# Patient Record
Sex: FEMALE | Race: WHITE | Employment: FULL TIME | URBAN - METROPOLITAN AREA
[De-identification: names, ages, dates, MRNs, and addresses within clinical notes are randomized per-mention and may not be internally consistent; named-entity substitution may affect disease eponyms.]

---

## 2017-02-20 NOTE — PROGRESS NOTES
HPI:    Patient ID: Layla Single is a 46year old female. HPI  with irregular and light menses on [facility]. She has bled almost weekly in past 3 months.  She is having some fatigue and also malodorous DC and intermittent deep thrus Neurological: She is alert. Skin: She is not diaphoretic.               ASSESSMENT/PLAN:   Encounter for gynecological examination without abnormal finding  (primary encounter diagnosis)  Menometrorrhagia  Screening mammogram, encounter for  Chronic fat

## 2017-02-20 NOTE — PROGRESS NOTES
IUD Removal     Pregnancy Results: n/a  Birth control method(s) used:  ; date last used:    Consent signed. Procedure discussed with the patient in detail including indication, risks, benefits, alternatives and complications.     Pelvic Exam Findings:  See

## 2017-03-15 NOTE — TELEPHONE ENCOUNTER
From: Sharon Flores  To: Triny More DO  Sent: 3/15/2017 1:25 PM CDT  Subject: Prescription Question    Hi- my pharmacist never received my prescription request on Feb 13th that was sent from your office. Can you please check into this?

## 2017-04-11 NOTE — TELEPHONE ENCOUNTER
From: Nicholas Flores  To:  Deya Medina DO  Sent: 4/11/2017 3:42 PM CDT  Subject: Non-Urgent Medical Question    Hi- wondering if you can send me the name of the doctor that Dr. Ana Dang referred me to for my mammogram.    Also- can Dr. Ana Dang

## 2017-05-01 NOTE — PROGRESS NOTES
Viridiana Gregorio is a 38year old female.  Patient presents with:  Ear Pain: Pt c/o on/off sharp pain in both ears     HPI:   She went to Immediate Care on April 13 with an approximately 1 month history of progressively severe and eventually constant EXAM:   GENERAL: Pleasant female appearing well and breathing comfortably in no distress  /76 mmHg  Pulse 67  Temp(Src) 98.3 °F (36.8 °C) (Oral)  Ht 5' 7.75\" (1.721 m)  Wt 151 lb (68.493 kg)  BMI 23.13 kg/m2  HEENT: Anicteric, conjunctiva pink,

## 2017-05-12 NOTE — TELEPHONE ENCOUNTER
Pt contacted and ENT information provided as below for pt to call for an OV appt. Pt verbalized understanding and agreement with plan of care.       Referral Information      Referred to [address]     Adonay Jimenez [address] [place] [address] [phone]

## 2017-05-12 NOTE — TELEPHONE ENCOUNTER
Per pt, she saw Dr Jodie Gifford last 05/01/2017 and mentioned to him issue about both ears and now both of her ears is still in pain and would like to know if he can send pt to ENT asap.   Pt stts that Dr Jodie Gifford did not give pt any ear drops,  took Ibuprofen it hel

## 2017-05-16 NOTE — PROGRESS NOTES
Ny Núñez is a 38year old female.   Patient presents with:  Ear Problem: c/o ear pain of both ears, had ear infections on 3rd week of april 2017 -had 1 course of antibiotic and per pt it did not help      HISTORY OF PRESENT ILLNESS  5/15/2017 Pap smear 2002     LEEP   • Bartholin cyst 2010     excision of Batholin's cyst   • Vulvar hematoma 2010     post op, evac and revision surgery           Past Surgical History    LEEP  2002    OTHER SURGICAL HISTORY  2010    Comment excision of Batholin's Normal, Left: Normal.   Skin Normal Inspection - Normal.         Lymph Detail Normal Submental. Submandibular. Anterior cervical. Posterior cervical. Supraclavicular.    Larynx  No lesions noted with normal vc monbility   Nose/Mouth/Throat Normal External n

## 2017-06-07 NOTE — ED PROVIDER NOTES
Patient Seen in: [facility] AND [facility] Emergency Department    History   Patient presents with: Ankle Injury    Stated Complaint: R ankle injury    HPI    Patient presents into the emergency room for evaluation of her right foot injury.   Patient states this drinks or equivalent per week       Comment: weekly      Review of Systems   Musculoskeletal:        Right foot injury   All other systems reviewed and are negative. Positive for stated complaint: R ankle injury  Other systems are as noted in HPI.   Co following splint application.           Disposition and Plan     Clinical Impression:  Foot fracture, right, closed, initial encounter  (primary encounter diagnosis)    Disposition:  Discharge    Follow-up:  Acosta Booker MD  [id number]

## 2017-06-07 NOTE — ED NOTES
Patient discharged, patient instructed to follow up with the doctor, return if worse, take medication as directed.

## 2017-06-08 NOTE — TELEPHONE ENCOUNTER
Pt calling back requesting referral be changed to Dr Gil Martinez/Uyen for fractured foot  Was able to get appt today at 1:50p

## 2017-06-08 NOTE — PROGRESS NOTES
HPI:    Patient ID: Francisco Diallo is a 38year old female. HPI  This pleasant 70-year-old female presents as a new patient to me on consult from [address] [place] [address].   Patient states that yesterday morning she missed stepped and had significant pain in the palpate the region. X-ray demonstrates a pointed out area as though there is a fracture of the lateral cuboid. It certainly not seen on a review and there is a question in my mind.   Based on location and the fact that she is better with splinting I place

## 2017-06-08 NOTE — TELEPHONE ENCOUNTER
Appointment made for this afternoon  With Dr. Katherine Sy. Pt informed on need for referral thru Dr. Edilson Lewis office.

## 2017-09-19 RX ORDER — VALACYCLOVIR HYDROCHLORIDE 500 MG/1
TABLET, FILM COATED ORAL
Qty: 30 TABLET | Refills: 0 | OUTPATIENT
Start: 2017-09-19

## 2017-10-26 NOTE — TELEPHONE ENCOUNTER
Pt advised that we did not deny refills. Pt was given 1 year rx 3/15/17 so should have refills remaining. Pt will call pharmacy. Pt advised to have pharmacy call us if any questions. Pt verbalizes understanding.

## 2018-01-29 NOTE — TELEPHONE ENCOUNTER
Pt calling to report that she has been having lower pelvic pain with IC for the past 6 months-1 year. Pt stated that the pain has gotten worse throughout the last few months.  Pt stated that the pelvic pain has become more regular now and also stated that s

## 2018-01-30 NOTE — PROGRESS NOTES
HPI:    Patient ID: Nori Shipley is a 38year old female. HPI   with menses q 28d / 7d / heavy flow. Withdrawal for BC. Here today with new complaint of entry and deep thrust dyspareunia in past six months.  She also relates lan Encounter      Urinalysis, Routine    Meds This Visit:  No prescriptions requested or ordered in this encounter    Imaging & Referrals:  US PELVIS EV (TRNS ABD AND ENDOVAG) (ECK=[id number])       FG#[id number]

## 2018-01-31 ENCOUNTER — TELEPHONE (OUTPATIENT)
Dept: OBGYN CLINIC | Facility: CLINIC | Age: 43
End: 2018-01-31

## 2018-01-31 NOTE — TELEPHONE ENCOUNTER
From: Silver Flores  To: Mauricio Savage DO  Sent: 1/31/2018 1:44 PM CST  Subject: Test Results Question    Hi I'm wondering if my urine test results came back yet?     Thank you

## 2018-02-01 NOTE — TELEPHONE ENCOUNTER
From: Cecy Flores  To: Alvin James DO  Sent: 2/1/2018 9:03 AM CST  Subject: Test Results Question    Hi- What does BV mean? How would I have contracted this? Does he still want me to get the ultra sound?  Could this be causing the pain in m

## 2018-02-02 NOTE — TELEPHONE ENCOUNTER
From: Mino Flores  To: Karolina Bassett DO  Sent: 2/2/2018 11:26 AM CST  Subject: Prescription Question    Hi Rosa I haven't started the prescription yet because it wasn't ready until today. But now I woke up with a stomach bug.  Wondering if I

## 2018-02-16 ENCOUNTER — TELEPHONE (OUTPATIENT)
Dept: OBGYN CLINIC | Facility: CLINIC | Age: 43
End: 2018-02-16

## 2018-02-16 NOTE — TELEPHONE ENCOUNTER
Helena from [facility] states pt has an appt with them for a mammogram and if an order can be put in the system.  Please advise

## 2018-02-26 NOTE — TELEPHONE ENCOUNTER
LAST ANNUAL 2-9-17 SO WILL NEED AN APPT FOR AN ANNUAL TO GET AN ORDER FROM DONNA.  REGISTRATION NOTIFIED AND SAID THEY WILL MIGUEL PT AS SELF-REFERRED INSTEAD.

## 2018-07-05 ENCOUNTER — TELEPHONE (OUTPATIENT)
Dept: OBGYN CLINIC | Facility: CLINIC | Age: 43
End: 2018-07-05

## 2018-07-05 NOTE — TELEPHONE ENCOUNTER
Pt requesting Valtrex refill. She feels an outbreak starting and ran out of refills. Pt states she usually takes it daily but she moved and was busy and forgot to take for a while. She is now getting an outbreak and needs a refill. Last annual 2/2017, seen for gyne problem 1/2018.  Routed to [facility] for refill approval.

## 2018-07-06 NOTE — ED PROVIDER NOTES
Patient Seen in: [facility]    History   Patient presents with:  Cough/URI    Stated Complaint: Cough    HPI    Pt is a 40 yo with a 10 day h/o cold sx. She is a smoker.  For the past 3 days, patient has been wheezing and congestion   Eyes: Conjunctivae and EOM are normal. Pupils are equal, round, and reactive to light. Neck: Normal range of motion. Neck supple. Cardiovascular: Normal rate and regular rhythm. Pulmonary/Chest: Effort normal. No respiratory distress.  Darrelyn Ouch

## 2018-07-06 NOTE — ED INITIAL ASSESSMENT (HPI)
5 days of congestion. Now with cough and body aches. Productive \"green\" cough. No fever. + smoker. Chest tightness with cough. Exertional SOB.

## 2018-08-08 NOTE — TELEPHONE ENCOUNTER
Pt states that she is having an out break and needs a rx for Valtrex. She states she also had an outbreak in 7/2018. Pt states that she had called for a rx in July 2018 and never got a response.       Pt's last Annual was 2-2017 and her last visit was 1-2

## 2019-02-18 NOTE — PATIENT INSTRUCTIONS
PLAN:  -fasting bloodwork  -referral to Derm provided  -Pneumovax vaccination  -log BP twice daily. Send me your log in 2 weeks for review  -smoking cessation encouraged.  Not motivated at this time  -f/u annually/as needed

## 2019-02-18 NOTE — PROGRESS NOTES
HPI: 41year old female presents for a general physical (NP). VSS. Denies fevers/chills, C.P., palpitations, dizziness, SOB, cough, abd pain, N/V/D, fatigue. No recent illness. Nml urine without dysuria or hematuria. Nml BM's. Weight is stable.  Sees OB/gyn Nml ADLs. Able to manage affairs. Head: Normocephalic, without obvious abnormality, atraumatic. Eyes: Conjunctivae/corneas clear. PERRL, EOMs intact. Ears: Normal TMs and external ear canals both ears. Nose: Nares normal. Septum midline.  Mucosa normal.

## 2019-02-19 PROBLEM — G47.00 INSOMNIA: Status: ACTIVE | Noted: 2019-02-19

## 2019-02-19 PROBLEM — R53.83 OTHER FATIGUE: Status: ACTIVE | Noted: 2019-02-19

## 2019-02-19 PROBLEM — F17.200 NICOTINE DEPENDENCE: Status: ACTIVE | Noted: 2019-02-19

## 2019-02-19 PROBLEM — L91.8 SKIN TAG: Status: ACTIVE | Noted: 2019-02-19

## 2019-02-19 PROBLEM — F41.0 PANIC ATTACKS: Status: ACTIVE | Noted: 2019-02-19

## 2019-02-19 PROBLEM — F41.9 ANXIETY: Status: ACTIVE | Noted: 2019-02-19

## 2019-02-19 PROBLEM — L82.1 SEBORRHEIC KERATOSIS: Status: ACTIVE | Noted: 2019-02-19

## 2019-02-19 PROBLEM — R23.3 EASY BRUISING: Status: ACTIVE | Noted: 2019-02-19

## 2019-02-20 PROBLEM — E78.5 DYSLIPIDEMIA: Status: ACTIVE | Noted: 2019-02-20

## 2019-02-21 NOTE — TELEPHONE ENCOUNTER
Depends on the underlying cause of fatigue (no abnormality in bloodwork to explain her s/s)-please have her f/u in the office to discuss further

## 2019-02-21 NOTE — TELEPHONE ENCOUNTER
Spoke to pt, informed her of lab results and recommendations per Dr. Nsetor Caban. Pt to increase exercise and take OTC fish oil. Pt instructed to recheck lipid levels in 3 months and focus on lifestyle modifications to decrease LDL and triglycerides.  Pt annmarie

## 2019-02-21 NOTE — TELEPHONE ENCOUNTER
----- Message from Emely Husbands, DO sent at 2/20/2019 10:02 AM CST -----  Nml CBC/CMP/TFTs/INR/PTT/iron levels    Lipids: LDL chol & trig elevated.  Advise HHD/DASH diet for LDL chol lowering; low carb diet, regular exercise: goal 5x/week of moderate-i

## 2019-05-14 NOTE — PATIENT INSTRUCTIONS
PLAN:  -Nicotine Patch  -referral to Gaby luis (Navigator)    -heart healthy diet/DASH diet for cholesterol lowering  -regular exercise: goal 5x/week of moderate-intensity exercise  -Omega 3 Fish Oils daily  -smoking cessation  -check lipids

## 2019-05-14 NOTE — PROGRESS NOTES
HPI: 45year old female presents for smoking cessation. VSS. Patient motivated to quit. Has not tried anything in the past but feels \"I need help quitting; I can't do it on my own\" She has not seen a therapist prior.  PMH significant for anxiety and panic cyanosis or edema. Skin: Skin color, texture, turgor normal. No rashes or lesions. Over 50% of this 25 minute visit was spent on counseling and coordination of care. IMPRESSION:  1. Nicotine Dependence  2. Anxiety  3. Panic Attacks  4.  Dyslipidemia

## 2019-06-25 NOTE — ED PROVIDER NOTES
Patient Seen in: [facility] [place] AND [facility] Emergency Department    History   Patient presents with:  Lower Extremity Injury (musculoskeletal)    Stated Complaint:     HPI    53-year-old female without walking her dogs and sprained her ankle she believes last nig well-developed. No distress. Head: Normocephalic and atraumatic. Eyes: Conjunctivae are normal. Pupils are equal, round, and reactive to light. Cardiovascular: Normal rate, regular rhythm and intact distal pulses.     Musculoskeletal: No noted edema o soon as possible for a visit in 2 days          Medications Prescribed:  Discharge Medication List as of 6/25/2019  7:49 AM

## 2019-06-25 NOTE — ED NOTES
Nickolas Mccallum is here for eval of left lateral foot pain since yesterday. States she was walking her dog last night when she slipped and rolled her ankle. She states she iced and elevated her foot yesterday but today had difficulty bearing weight.   She rates

## 2019-06-25 NOTE — ED INITIAL ASSESSMENT (HPI)
Left ankle pain s/p fall yesterday while walking her dog. Cms intact.  Pt  Reports unable to bear weight today on ext

## 2019-06-27 PROBLEM — N92.0 MENORRHAGIA WITH REGULAR CYCLE: Status: ACTIVE | Noted: 2019-06-27

## 2019-06-27 PROBLEM — M79.672 LEFT FOOT PAIN: Status: ACTIVE | Noted: 2019-06-27

## 2019-06-27 PROBLEM — M25.572 ACUTE LEFT ANKLE PAIN: Status: ACTIVE | Noted: 2019-06-27

## 2019-06-27 NOTE — TELEPHONE ENCOUNTER
Pt left foot hurt after injuery. Pt was at ER on Tuesday. Pt is asking if Dr Pedro Don can prescribe any anti-inflammatory med for her. Plz call to advise.

## 2019-06-27 NOTE — PATIENT INSTRUCTIONS
PLAN:  -T#3 for pain as needed  -OTC Ibuprofen 600mg every 6 hours as needed  -ice, elevate, rest  -brace  -referral to Orthopedics provided  -f/u as needed

## 2019-06-27 NOTE — TELEPHONE ENCOUNTER
Spoke with pt, advised apt for ER follow up L ankle injury and continuation of rx's prescribed by ER.   Patient verbalizes understanding, scheduled apt for today at 1 pm.

## 2019-06-27 NOTE — PROGRESS NOTES
HPI: 45year old female presents for 2 day ER f/u on L ankle sprain. VSS. L Foot Xray unremarkable. Aircast given in ER. C/o L ankle & foot pain 10/10 since incident. Describes dog pulling and patient turned ankle in significantly.  Aggrevated by thong DALTON normal. No rashes or lesions. Over 50% of this 25 minute visit was spent on counseling and coordination of care. IMPRESSION:  1. L Ankle Pain - sprain suspected however patient experiencing 10/10 pain. Hairline fx?  Referred to Ortho    Patient Active

## 2019-06-28 NOTE — PROGRESS NOTES
HPI:    Patient ID: Karon Garcia is a 45year old female. HPI   with menses normally q 28d / 7d / extreme flow x 3 days. She has avoided work and also had clothing accidents on worst days. She is in 5 year relationship.  Kids are 10 and well-developed and well-nourished. No distress. Genitourinary:   Genitourinary Comments: EG, vagina and cervix w/o lesions. Uterus NSSC and no adnexal mass / tenderness. Skin: She is not diaphoretic.               ASSESSMENT/PLAN:   Menorrhagia with

## 2019-06-28 NOTE — PROGRESS NOTES
IUD Insertion     Pregnancy Results: negative from urine test   Birth control method(s) used: withdrawal  Consent signed. Procedure discussed with the patient in detail including indication, risks, benefits, alternatives and complications.     Pelvic Exam

## 2019-09-12 NOTE — TELEPHONE ENCOUNTER
LAST ANNUAL 2-9-17 WAS SEEN FOR PROBLEM ON 1-30-18 AND HAD IUD PLACED 6-18-19. LAST PAP 2-9-17, LAST MAMMO 3-3-18 AND NO FUTURE APPT MADE. SENT BACK RX DENIED, NEEDS APPT.

## 2019-10-15 NOTE — TELEPHONE ENCOUNTER
LAST ANNUAL 2-9-17 (PAP NORMAL). RX FOR VALTREX WAS SENT ON 8-8-19 FOR 1 YEAR. SENT MESSAGE TO PT INDICATING SHE NEEDS TO SCHEDULE ANNUAL FIRST THEN CONTACT US SO WE CAN ASK DONNA FOR REFILLS.

## 2019-10-21 PROBLEM — F41.1 GAD (GENERALIZED ANXIETY DISORDER): Status: ACTIVE | Noted: 2019-10-21

## 2019-12-04 NOTE — ED INITIAL ASSESSMENT (HPI)
Pt to IC with sore throat and cough x 3 days. States she has been exposed to strep throat and pneumonia. Low grade fever 99. Denies shortness of breath or difficulty breathing.

## 2019-12-04 NOTE — ED PROVIDER NOTES
Patient Seen in: [facility]      History   Patient presents with:  Cough/URI  Sore Throat    Stated Complaint: cough/sore throat     HPI    The patient is a 44-year-old female with past history of anxiety who presents now Vitals [12/04/19 0838]   /86   Pulse 89   Resp 20   Temp 98.2 °F (36.8 °C)   Temp src Oral   SpO2 98 %   O2 Device None (Room air)       Current:/86   Pulse 89   Temp 98.2 °F (36.8 °C) (Oral)   Resp 20   Ht 172.7 cm (5' 8\")   Wt 68 kg   LMP 11 capsule Refills: 0

## 2020-01-09 RX ORDER — VALACYCLOVIR HYDROCHLORIDE 500 MG/1
500 TABLET, FILM COATED ORAL DAILY
Qty: 30 TABLET | Refills: 11 | OUTPATIENT
Start: 2020-01-09

## 2020-01-09 NOTE — TELEPHONE ENCOUNTER
Pt requesting valtrex rx refill for outbreak. Informed pt the Valtrex rx from October 2019 is for one month supply and refills for one year. Pt states she did not know. Pt will call her pharmacy.  Advised to have pharmacy call the clinic if there is a probl

## 2020-03-02 ENCOUNTER — TELEPHONE (OUTPATIENT)
Dept: OBGYN CLINIC | Facility: CLINIC | Age: 45
End: 2020-03-02

## 2020-03-02 DIAGNOSIS — N92.0 MENORRHAGIA WITH REGULAR CYCLE: Primary | ICD-10-CM

## 2020-03-02 DIAGNOSIS — Z30.2 STERILIZATION: ICD-10-CM

## 2020-03-02 NOTE — TELEPHONE ENCOUNTER
LEFT MESSAGE WE CANNOT SCHEDULE ON 3-19 AND LIKELY NOT POSSIBLE ON FRI, 3-20 BUT WILL CHECK WITH DONNA AND GET BACK TO HER. MESSAGE TO DONNA.  ON 3-20, YOU HAVE A C-SX 1:30 AND MYOSURE MYOMECTOMY AT 2:30. WOULD YOU STILL BE WILLING TO DO THIS CASE TO FOLLOW? SURGERY ORDER IN 3-1-20 PHONE ENCOUNTER.

## 2020-03-02 NOTE — TELEPHONE ENCOUNTER
PT NOTIFIED. ASKING IF 3-23 IS POSSIBLE. MESSAGE TO DONNA.  YOU HAVE ALREADY AGREED TO ASSIST JAZMYN WITH C-SX ON THUR, 3-26. DO YOU WANT TO DO THIS CASE BEFORE OR AFTER THIS OR PT IS INTERESTED IN MON, 3-23 IN THE AFTERNOON?

## 2020-03-02 NOTE — TELEPHONE ENCOUNTER
OB GYN SURGICAL SCHEDULING    Assessment: Menorrhagia with regular cycle, Sterilization request    Pre-Operative Procedure:  IUD removal, Operative hysteroscopy with d and C , Novasure ablation and followed by Laparoscopic tubal with rings    Side: bilater

## 2020-03-02 NOTE — TELEPHONE ENCOUNTER
OK I shouldn't do  if I have call, meeting ( [phone] ) and  assist to follow. It is OK to put this case at 1:30 PM ( 45 minutes ) with the abdominal hyst to follow. I believe I sent that other case request to you.

## 2020-03-02 NOTE — PROGRESS NOTES
HPI:    Patient ID: Tootie Cavazos is a 45year old female. HPI   with monthly menses on Mirena for [facility] and menorrhagia control. She still has 4 heavy days despite Mirena. She asks for other options.  She is in 6 year relationship and they were tenderness. There is no rebound and no guarding. Genitourinary:    Vagina and uterus normal.   No breast discharge. There is no rash or lesion on the right labia. There is no rash or lesion on the left labia. Uterus is not enlarged and not tender.  Cervix Visit:  Requested Prescriptions      No prescriptions requested or ordered in this encounter       Imaging & Referrals:  None       AP#[id number]

## 2020-03-03 NOTE — TELEPHONE ENCOUNTER
MESSAGE REVIEWED WITH DONNA IN THE OFFICE YESTERDAY. HE CAN DO THIS CASE ON MON, 3-23 EARLY AFTERNOON, AFTER OFFICE HOURS. THERE IS A 1PM OPENING. SURGERY CASE REQUEST INITIATED. PT NOTIFIED OF SURGERY DATE AND TIME. SHE AGREED TO INSTRUCTIONS THROUGH MY CHART. MINOR CASE AND ANTIMICROBIAL [facility] INSTRUCTIONS SENT. ENCOURAGED TO CALL BACK WITH ANY QUESTIONS OR CONCERNS.

## 2020-03-09 NOTE — ED PROVIDER NOTES
Patient Seen in: [address]    History   No chief complaint on file. Stated Complaint: sore throat, ear pain    HPI    Patient here with sore throat for 2 days. No travel,no known  sick contacts .   Patient denies sig Yes      Alcohol/week: 0.0 standard drinks      Comment: weekly    Drug use: No      Review of Systems    Positive for stated complaint: sore throat, ear pain  Other systems are as noted in HPI. Constitutional and vital signs reviewed.       All other syst total) by mouth 2 (two) times daily for 10 days.   Qty: 20 tablet Refills: 0

## 2020-03-16 NOTE — TELEPHONE ENCOUNTER
PT NOTIFIED THAT DUE TO CORONA VIRUS WE HAVE TO CANCEL SURGERY AND WILL CALL HER BACK IN A FEW DAYS TO CHOOSE ANOTHER DATE.  (SEE 3-1-20 PHONE ENCOUNTER [address])   PT VERBALIZED UNDERSTANDING. SURGERY CHANGE REQUEST PLACED.

## 2020-03-17 NOTE — TELEPHONE ENCOUNTER
Advised no surgeries will be performed before 4/20/20. Will call back once get the OK to start re-scheduling them.

## 2020-03-18 NOTE — PROGRESS NOTES
Bharat Sarmiento. You have negative pap and HPV test. Continue yearly exam and do pap in 3 years. You can see the statement that pap suggests a bacterial vaginal infection.  Your exam was normal so I would only treat if you were having an odorous vaginal discharge

## 2020-04-30 NOTE — TELEPHONE ENCOUNTER
From: Steven Flores  To: Arabella Pulliam MD  Sent: 4/30/2020 10:51 AM CDT  Subject: Other    I have been in the same house with my dad who just tested positive for Covid.  What do I need to do

## 2020-04-30 NOTE — TELEPHONE ENCOUNTER
Pt called stating that his dad was recently diagnosed positive for COVID and she had contact with him on the weekend. Pt has been getting headaches, feels like she is getting a cold and she also feels tired.   Please call and advise

## 2020-04-30 NOTE — TELEPHONE ENCOUNTER
[place], [place] 4 minutes ago (11:10 AM)         Pt called stating that his dad was recently diagnosed positive for COVID and she had contact with him on the weekend.   Pt has been getting headaches, feels like she is getting a cold and she also feels ti

## 2020-04-30 NOTE — TELEPHONE ENCOUNTER
Patient also sent a MyChart message. Combined phone encounter and MyChart message into one encounter as it is relative to one another. Closing this encounter.

## 2020-05-04 NOTE — PROGRESS NOTES
Virtual Telephone Check-In    Stephanie Flores verbally consents to a Virtual/Telephone Check-In visit on 05/04/20        Patient understands and accepts financial responsibility for any deductible, co-insurance and/or co-pays associated with this was completed using two-way, real-time interactive audio and/or video communication.   This has been done in good nate to provide continuity of care in the best interest of the provider-patient relationship, due to the ongoing public health crisis/national

## 2020-05-08 NOTE — PROGRESS NOTES
Pt called 5/8 w/ development of symptoms since yesterday with low grade temps of 99, headaches, nausea, dizzy. Given development of symptoms w/ + exposure will obtain COVID testing.     Diagnoses and all orders for this visit:    Exposure to 2019 novel co

## 2020-06-18 NOTE — TELEPHONE ENCOUNTER
From: Avinash Flores  To: Wayne Fuller.  [facility]-DO STEPHANIE  Sent: 6/18/2020 12:10 PM CDT  Subject: Other    Hi- I am wondering if I can schedule my out patient procedure that I was supposed to have in March but was canceled due to Covid   Thank you   [facility]

## 2020-06-19 NOTE — TELEPHONE ENCOUNTER
Spoke to pt. Advised await DONNA responds to schedule surgery.  Verbalized understandings    Message sent to DONNA to provided dates available to schedule surgery

## 2020-07-14 NOTE — TELEPHONE ENCOUNTER
Pt called stating that she was just told that one of her coworker got diagnosed yesterday with COVID, and now they wont let her work until she gets tested too.   Pt has no symptoms but wants to get tested so she can return to work

## 2020-07-15 NOTE — TELEPHONE ENCOUNTER
COVID testing order placed as authorized by Dr. Severiano Artist. Patient notified, that same protocols apply.  It will go through the panel of doctors for approval. If approved, she will get a call today about scheduling it; if denied, doctor will get notified

## 2020-11-03 NOTE — TELEPHONE ENCOUNTER
C/o terrible pain every other week. Ibuprofen and a heating pad help a little. Pt wants to get the surgery scheduled again. Aware we will forward to DONNA for approval and that Vanessa Hargrove will be in contact with her to schedule.

## 2020-11-03 NOTE — TELEPHONE ENCOUNTER
Patient is calling because she is in a lot of pain. She was supposed to have an Ablation  in March after talking with  [address] [place] [address] on 2/28. Please advise her of how to get this scheduled and move forward.

## 2020-11-13 NOTE — TELEPHONE ENCOUNTER
Spoke to pt.  Advised surgery is scheduled on Wed,12/2/20 at 12pm.    Surgery order automatically initiated referral and gets sent to [facility] for approval.    Minor case instructions sent via [employer]    Delayed staff message sent to MD to place pre-op o

## 2020-12-01 PROBLEM — Z30.2 REQUEST FOR STERILIZATION: Status: ACTIVE | Noted: 2020-12-01

## 2020-12-02 NOTE — BRIEF OP NOTE
Pre-Operative Diagnosis: Menorrhagia with regular cycle [N92.0]  Request for sterilization [Z30.2]     Post-Operative Diagnosis: Menorrhagia with regular cycle [R45. 0]Request for sterilization [Z30.2]      Procedure Performed:   Procedure(s):  intrauterine

## 2020-12-02 NOTE — H&P
[facility]    History and Physical    Anagautam Mullins Sandra Patient Status:  Hospital Outpatient Surgery    1975 MRN [id number]   Location [place] [address] Attending Benedicto Laboy, 3 [facility] Kel Alfaro Day # 0 PCP Ever Gallardo Smoking status: Current Every Day Smoker        Packs/day: 1.50        Years: 15.00        Pack years: 22.5      Smokeless tobacco: Current User      Tobacco comment: not motivated to quit at this time    Alcohol use:  Yes      Alcohol/week: 1.0 - 2.0 west no mass, no tenderness and no fullness. No vaginal discharge. Cervical Papanicolaou 02-28-20 with negative cytology and HPV testing.      Results:     Lab Results   Component Value Date    WBC 7.1 02/19/2019    HGB 13.3 02/19/2019    HCT 40.0 02/19/201

## 2020-12-02 NOTE — ANESTHESIA PROCEDURE NOTES
Airway  Date/Time: 12/2/2020 1:27 PM  Urgency: Elective    Airway not difficult    General Information and Staff    Patient location during procedure: OR  Anesthesiologist: Dre Flores MD  Performed: anesthesiologist     Indications and Patient Conditio

## 2020-12-02 NOTE — ANESTHESIA POSTPROCEDURE EVALUATION
Patient: Maury Flores    Procedure Summary     Date: 12/02/20 Room / Location: [address] [place] [address] MAIN OR  / [address] [place] [address] MAIN OR    Anesthesia Start: 5092 Anesthesia Stop: 9534    Procedure: LAPAROSCOPIC TUBAL LIGATION (Bilateral ) Diagnosis:       Menorrhagia wit

## 2020-12-02 NOTE — ANESTHESIA PREPROCEDURE EVALUATION
Anesthesia PreOp Note    HPI:     Cinthya Reeves is a 39year old female who presents for preoperative consultation requested by: Lupillo Albarran DO    Date of Surgery: 12/2/2020    Procedure(s):  LAPAROSCOPIC TUBAL LIGATION  HYSTEROSCOPY ENDO Surgical History:   Procedure Laterality Date   • HYSTEROSCOPY     • LEEP  2002   • OTHER SURGICAL HISTORY  2010    Excision of Bartholin's cyst   • OTHER SURGICAL HISTORY  2010    Evacuation and revision surgery for vulvar hematoma         •  [place]estr Use      Smoking status: Current Every Day Smoker        Packs/day: 1.50        Years: 15.00        Pack years: 22.5      Smokeless tobacco: Current User      Tobacco comment: not motivated to quit at this time    Substance and Sexual Activity      Alcohol kg/m². height is 1.702 m (5' 7\") and weight is 73.9 kg (163 lb). Her oral temperature is 98.4 °F (36.9 °C). Her blood pressure is 150/92 (abnormal) and her pulse is 83. Her respiration is 15 and oxygen saturation is 98%.     11/28/20  1053 12/02/20  1117

## 2020-12-02 NOTE — INTERVAL H&P NOTE
Pre-op Diagnosis: Menorrhagia with regular cycle [N92.0]  Request for sterilization [Z30.2]    The above referenced H&P was reviewed by Katie Swanson DO on 12/2/2020, the patient was examined and no significant changes have occurred in the patient's co

## 2020-12-03 NOTE — OPERATIVE REPORT
[facility]    PATIENT'S NAME: Jericho NUÑEZ   ATTENDING PHYSICIAN: Nelson Aburto DO   OPERATING PHYSICIAN: Maricruz Aburto DO   PATIENT ACCOUNT#:   [de-identified]    LOCATION:  [facility] PACU 10 [facility] 10  MEDICAL RECORD #:   [id number] small amount of tissue and blood. I then asked for the NovaSure device and it was prepared, noting the uterine length measured at 5 cm, using total sound of 9 cm minus cervical length which was 4 cm. The device was set and inserted and opened.   Cavity wi abnormalities seen in the pelvis or upper abdomen. I noticed a scant bit of spotty bleeding coming from the left lower quadrant incision toward the peritoneal cavity.   I removed the 8 mm trocar and then placed a single figure-of-eight suture of 0 Vicryl u

## 2020-12-10 NOTE — ED PROVIDER NOTES
Patient Seen in: [facility] Department      History   Patient presents with:  Laceration/Abrasion    Stated Complaint: Dog bite to the face    HPI    53-year-old female presents for evaluation for a dog bite to the face.   This occurred y Cardiovascular: Negative. Gastrointestinal: Negative. Genitourinary: Negative. Musculoskeletal: Negative. Skin: Negative. Neurological: Negative. All other systems reviewed and are negative.       Positive for stated complaint: Dog bite to Effort: Pulmonary effort is normal. No respiratory distress. Breath sounds: Normal air entry. Abdominal:      General: Abdomen is flat. Bowel sounds are normal.      Palpations: Abdomen is soft. Musculoskeletal: Normal range of motion. The more superior portion of the wound was well approximated and was repaired with some surgical adhesive. The wound was dressed and antibiotic ointment was applied. Patient tolerated the procedure without complications.        Wound Debridement Note:  Pr She is highly encouraged to follow-up with her primary care provider in 2 days for wound check. If she cannot she is to return to the ER for any worsening symptoms.     Imaging:   Xr Hand (min 3 Views), Right (cpt=[id number])    Result Date: 12/10/2020  PROCEDU Take 1 tablet (600 mg total) by mouth every 8 (eight) hours as needed for Pain or Fever., Normal, Disp-30 tablet, R-0    !! - Potential duplicate medications found. Please discuss with provider.

## 2020-12-10 NOTE — ED NOTES
Pt to ED after suffering multiple dog bites to the face and right hand. The dog was owned by the patient and the dog's vaccines are UTD. Multiple small puncture wounds present periorbital to right eye.  Largest is 1.5cm below right eye and two 1cm lacs in r

## 2020-12-10 NOTE — ED NOTES
Discharge summary reviewed including medication administration and follow up appointments. Advised to return to the Emergency Department with new or worsening symptoms. Patient verbalized understanding. All questions answered at this time.  Discharged home

## 2020-12-23 NOTE — ED PROVIDER NOTES
Patient presents with:  Euell Necessary      HPI:     Sruthidinora Flores is a 39year old female presents for suture removal removal. Injury occurred 13 days ago. The patient denies wound problems or concerns.      Family History   Problem Rela Active member of club or organization: Not on file        Attends meetings of clubs or organizations: Not on file        Relationship status: Not on file      Intimate partner violence        Fear of current or ex partner: Not on file        Emotionally 12/02/2020 03:03 PM                                 Operating Room                                                               Pathologist:           Chaparrita John MD                                                        Specimen:    Endometrium, 1

## 2021-03-15 RX ORDER — VALACYCLOVIR HYDROCHLORIDE 500 MG/1
TABLET, FILM COATED ORAL
Qty: 30 TABLET | Refills: 0 | OUTPATIENT
Start: 2021-03-15

## 2021-03-16 NOTE — ED PROVIDER NOTES
Patient Seen in: [facility]      History   Patient presents with:  Leg or Foot Injury    Stated Complaint: Rt foot pain    HPI/Subjective: The history is provided by the patient. Foot Injury   The incident occurred more than 2 days ago. Constitutional: Negative. HENT: Negative. Eyes: Negative. Respiratory: Negative. Cardiovascular: Negative. Gastrointestinal: Negative. Genitourinary: Negative. Musculoskeletal: Negative. Skin: Negative. Negative for wound.    Neurol Comments: Positive CMS. Strong pedal pulse. Patient has limited range of motion to the right first toe due to swelling and pain. Patient has ecchymosis noted. Swelling noted. No erythema or warmth noted. No abrasions or lacerations noted.   No bleedin days ago. Patient states that she has been having toe pain and swelling since. Positive CMS. Strong pedal pulse. Patient has limited range of motion to the right first toe due to swelling and pain. Patient has ecchymosis noted. Swelling noted.   No eryt Jason Jalloh MD  [address]  [facility] [address]  [phone]    In 2 days            Medications Prescribed:  Discharge Medication List as of 3/16/2021  1:06 PM

## 2021-03-17 NOTE — TELEPHONE ENCOUNTER
Spoke with WES in regards to pts message. WES stated that pt can just do annual exam and does not need separate post op appt. Wes stated that we can refill valtrex to cover pt until annual exam. Pt scheduled annual for 4/27 with WES in HND.  Refill of valtr

## 2021-03-17 NOTE — TELEPHONE ENCOUNTER
Patient has a follow up from her post op surgery with Dr. Mauri Finn on April 20th. Was suppose to be sooner. Patient states will need refill on her valtrex asap.      Please advise

## 2021-03-17 NOTE — TELEPHONE ENCOUNTER
Pt states she missed a call from our office. Informed pt message was sent to DONNA earlier and we are awaiting his reply. Pt verbalized understanding.

## 2021-03-17 NOTE — TELEPHONE ENCOUNTER
Pt had IUD removal, followed by operative hysteroscopy with D&C, novasure endometrial ablation, and laparoscopic tubal ligation with falope rings with DONNA on 12/3.  Pt stated that she never came for post op appt and is also due for annual exam. Pt asking if

## 2021-04-27 NOTE — PROGRESS NOTES
HPI/Subjective:   Patient ID: Chan Coon is a 42year old female. HPI   with monthly spotting since ablation in 2020. TL for [facility]. No new family or personal medical issues except elevated BP.  She is looking for a PCP close thyromegaly. Cardiovascular:      Rate and Rhythm: Normal rate and regular rhythm. Heart sounds: Normal heart sounds. No murmur heard. Pulmonary:      Effort: Pulmonary effort is normal.      Breath sounds: Normal breath sounds.  No wheezing or r

## 2021-07-31 RX ORDER — VALACYCLOVIR HYDROCHLORIDE 500 MG/1
TABLET, FILM COATED ORAL
Qty: 30 TABLET | Refills: 0 | OUTPATIENT
Start: 2021-07-31

## 2021-08-14 RX ORDER — VALACYCLOVIR HYDROCHLORIDE 500 MG/1
500 TABLET, FILM COATED ORAL DAILY
Qty: 30 TABLET | Refills: 1 | OUTPATIENT
Start: 2021-08-14

## 2021-08-18 NOTE — TELEPHONE ENCOUNTER
Pt states current outbreak that started on 8/13/2021, pt states first outbreak in over a year. Pt requesting refill on Valtrex. Annual: 4/27/2021  PAP/HPV: 2/28/2020 Negative  Mammo: 5/7/2021 Negative    Pharmacy confirmed.      To DONNA to please review

## 2021-12-17 NOTE — TELEPHONE ENCOUNTER
Last annual - 4/27/2021    Pt's comments on med request states she is currently having an outbreak. Rx was sent on 8/19/2021 for #30 with 1 refill.     TCB to find out how she takes med with outbreaks as rx is usually given for #6, take twice a day for

## 2022-08-05 NOTE — TELEPHONE ENCOUNTER
DONNA pt. Last annual was 4/2021. Pt did schedule annual with DONNA in 1/2023. Pt is on valtrex for suppression. States she is currently having an outbreak. Message to [facility] on call. Amina Joe for refill of valtrex?

## 2022-08-05 NOTE — TELEPHONE ENCOUNTER
Last annual - 4/27/2021  Last pap - 2/28/2020, normal  Last mammo - 5/7/2021, negative    Last refill was given on 12/20/2021 for #30 with 2 refills. Pt uses suppressive therapy. No future annual scheduled. Message sent to pt advising her to schedule annual exam.  We can then address refills.

## 2022-09-26 ENCOUNTER — APPOINTMENT (OUTPATIENT)
Dept: URBAN - METROPOLITAN AREA CLINIC 244 | Age: 47
Setting detail: DERMATOLOGY
End: 2022-09-26

## 2022-09-26 DIAGNOSIS — L91.8 OTHER HYPERTROPHIC DISORDERS OF THE SKIN: ICD-10-CM

## 2022-09-26 DIAGNOSIS — D22 MELANOCYTIC NEVI: ICD-10-CM

## 2022-09-26 DIAGNOSIS — L82.1 OTHER SEBORRHEIC KERATOSIS: ICD-10-CM

## 2022-09-26 DIAGNOSIS — L82.0 INFLAMED SEBORRHEIC KERATOSIS: ICD-10-CM

## 2022-09-26 DIAGNOSIS — B07.8 OTHER VIRAL WARTS: ICD-10-CM

## 2022-09-26 PROBLEM — D48.5 NEOPLASM OF UNCERTAIN BEHAVIOR OF SKIN: Status: ACTIVE | Noted: 2022-09-26

## 2022-09-26 PROCEDURE — 17110 DESTRUCT B9 LESION 1-14: CPT

## 2022-09-26 PROCEDURE — OTHER BIOPSY BY SHAVE METHOD: OTHER

## 2022-09-26 PROCEDURE — 99213 OFFICE O/P EST LOW 20 MIN: CPT | Mod: 25

## 2022-09-26 PROCEDURE — 11102 TANGNTL BX SKIN SINGLE LES: CPT | Mod: 59

## 2022-09-26 PROCEDURE — OTHER LIQUID NITROGEN: OTHER

## 2022-09-26 PROCEDURE — OTHER COUNSELING: OTHER

## 2022-09-26 ASSESSMENT — LOCATION DETAILED DESCRIPTION DERM
LOCATION DETAILED: RIGHT MEDIAL BREAST 5-6:00 REGION
LOCATION DETAILED: RIGHT LATERAL BREAST 6-7:00 REGION
LOCATION DETAILED: LEFT SUPERIOR FLANK
LOCATION DETAILED: LEFT MEDIAL BREAST 6-7:00 REGION
LOCATION DETAILED: LEFT RIB CAGE
LOCATION DETAILED: RIGHT INFRAMAMMARY CREASE (OUTER QUADRANT)
LOCATION DETAILED: LEFT INFRAMAMMARY CREASE (INNER QUADRANT)
LOCATION DETAILED: LEFT LATERAL BREAST 5-6:00 REGION
LOCATION DETAILED: RIGHT INFRAMAMMARY CREASE (INNER QUADRANT)
LOCATION DETAILED: RIGHT MEDIAL BREAST 4-5:00 REGION
LOCATION DETAILED: LEFT MEDIAL BREAST 7-8:00 REGION
LOCATION DETAILED: LEFT MEDIAL BREAST 6-7:00 REGION

## 2022-09-26 ASSESSMENT — LOCATION ZONE DERM
LOCATION ZONE: TRUNK
LOCATION ZONE: TRUNK

## 2022-09-26 ASSESSMENT — LOCATION SIMPLE DESCRIPTION DERM
LOCATION SIMPLE: LEFT BREAST
LOCATION SIMPLE: ABDOMEN
LOCATION SIMPLE: LEFT BREAST
LOCATION SIMPLE: RIGHT BREAST

## 2022-09-26 NOTE — PROCEDURE: BIOPSY BY SHAVE METHOD
Hide Anesthesia Volume?: No
Size Of Lesion In Cm: 0
Cryotherapy Text: The wound bed was treated with cryotherapy after the biopsy was performed.
Notification Instructions: Patient will be notified of biopsy results. However, patient instructed to call the office if not contacted within 2 weeks.
Wound Care: Petrolatum
Electrodesiccation And Curettage Text: The wound bed was treated with electrodesiccation and curettage after the biopsy was performed.
Anesthesia Volume In Cc (Will Not Render If 0): 0.5
Post-Care Instructions: I reviewed with the patient in detail post-care instructions. Patient is to keep the biopsy site dry overnight, and then apply Petrolatum twice daily until healed, or after a night or two leave area open and dry overnight and a scab will form which will fall off on its own in a few weeks.
Consent: Written consent was obtained and risks were reviewed including but not limited to scarring, infection, bleeding, scabbing, incomplete removal, nerve damage and allergy to anesthesia.
Biopsy Method: Dermablade
Type Of Destruction Used: Curettage
Dressing: bandage
Silver Nitrate Text: The wound bed was treated with silver nitrate after the biopsy was performed.
Billing Type: Third-Party Bill
Information: Selecting Yes will display possible errors in your note based on the variables you have selected. This validation is only offered as a suggestion for you. PLEASE NOTE THAT THE VALIDATION TEXT WILL BE REMOVED WHEN YOU FINALIZE YOUR NOTE. IF YOU WANT TO FAX A PRELIMINARY NOTE YOU WILL NEED TO TOGGLE THIS TO 'NO' IF YOU DO NOT WANT IT IN YOUR FAXED NOTE.
Curettage Text: The wound bed was treated with curettage after the biopsy was performed.
Was A Bandage Applied: Yes
Anesthesia Type: 0.5% lidocaine with 1:200,000 epinephrine and a 1:10 solution of 8.4% sodium bicarbonate
Depth Of Biopsy: dermis
Biopsy Type: H and E
Hemostasis: Drysol
Detail Level: Detailed
Electrodesiccation Text: The wound bed was treated with electrodesiccation after the biopsy was performed.

## 2022-09-26 NOTE — PROCEDURE: LIQUID NITROGEN
Medical Necessity Information: It is in your best interest to select a reason for this procedure from the list below. All of these items fulfill various CMS LCD requirements except the new and changing color options.
Total Number Of Lesions Treated: 5
Render Post Care In The Note?: yes
Medical Necessity Clause: This procedure was medically necessary because the lesions that were treated were:
Number Of Freeze-Thaw Cycles: 2 freeze-thaw cycles
Add 52 Modifier (Optional): no
Post-Care Instructions: I reviewed with the patient in detail post-care instructions. Patient is to wear sunprotection, and avoid picking at any of the treated lesions. Pt may apply Vaseline to crusted or scabbing areas. If blistering occurs, pt understands to not pop blister and can cover with Vaseline + Band-Aid. If any topicals are prescribed (i.e wart peel or topical S.A), pt is to wait 5-7 days before applying to treated areas.
Consent: The patient's consent was obtained including but not limited to risks of crusting, scabbing, blistering, scarring, darker or lighter pigmentary change, recurrence, incomplete removal and infection.
Spray Paint Text: The liquid nitrogen was applied to the skin utilizing a spray paint frosting technique.
Number Of Freeze-Thaw Cycles: 1 freeze-thaw cycle
Detail Level: Detailed
Detail Level: Zone
Application Tool (Optional): Liquid Nitrogen Sprayer
Duration Of Freeze Thaw-Cycle (Seconds): 5-10
Duration Of Freeze Thaw-Cycle (Seconds): 0
Post-Care Instructions: I reviewed with the patient in detail post-care instructions. Patient is to wear sunprotection, and avoid picking at any of the treated lesions. Pt may apply Vaseline to crusted or scabbing areas.
Consent: The patient's consent was obtained including but not limited to risks of crusting, scabbing, blistering, scarring, darker or lighter pigmentary change, recurrence, incomplete removal/need for repeat treatments, and infection. Patient understands that treatment on feet/hands may limit daily activities if pain is significant after treatment and/or if blistering occurs.

## 2022-09-26 NOTE — PROCEDURE: COUNSELING
Cryotherapy Recommendations: Recommend cryodestruction/cryotherapy to warts given their viral nature and propensity to grow or spread in some individuals, but I discussed the risk of blistering, pain, dyschromia, need for repeat treatments, rare risk of scarring with treatment, among other risks associated with treatment. Advised pt that treatment on feet/hands may limit daily activities if pain is significant after treatment. Risks, benefits, and alternatives of cryotherapy discussed.
Detail Level: Detailed
Topical Salicylic Acid Recommendations: Always wait 5-7 days after cryotherapy/cryodestruction before using.
Detail Level: Zone

## 2023-01-05 PROBLEM — R03.0 ELEVATED BLOOD PRESSURE READING IN OFFICE WITHOUT DIAGNOSIS OF HYPERTENSION: Status: ACTIVE | Noted: 2023-01-05

## 2023-01-05 PROBLEM — B00.9 HSV INFECTION: Status: ACTIVE | Noted: 2023-01-05

## 2023-04-07 ENCOUNTER — APPOINTMENT (OUTPATIENT)
Dept: URBAN - METROPOLITAN AREA CLINIC 244 | Age: 48
Setting detail: DERMATOLOGY
End: 2023-04-07

## 2023-04-07 DIAGNOSIS — L82.0 INFLAMED SEBORRHEIC KERATOSIS: ICD-10-CM

## 2023-04-07 DIAGNOSIS — L82.1 OTHER SEBORRHEIC KERATOSIS: ICD-10-CM

## 2023-04-07 DIAGNOSIS — L91.8 OTHER HYPERTROPHIC DISORDERS OF THE SKIN: ICD-10-CM

## 2023-04-07 PROCEDURE — 99212 OFFICE O/P EST SF 10 MIN: CPT | Mod: 25

## 2023-04-07 PROCEDURE — OTHER LIQUID NITROGEN: OTHER

## 2023-04-07 PROCEDURE — OTHER COUNSELING: OTHER

## 2023-04-07 PROCEDURE — 17110 DESTRUCT B9 LESION 1-14: CPT

## 2023-04-07 PROCEDURE — OTHER ADDITIONAL NOTES: OTHER

## 2023-04-07 PROCEDURE — OTHER SKIN TAG REMOVAL (COSMETIC): OTHER

## 2023-04-07 ASSESSMENT — LOCATION DETAILED DESCRIPTION DERM
LOCATION DETAILED: RIGHT INFERIOR ANTERIOR NECK
LOCATION DETAILED: LEFT INFERIOR LATERAL NECK
LOCATION DETAILED: EPIGASTRIC SKIN
LOCATION DETAILED: LEFT SUPERIOR LATERAL NECK
LOCATION DETAILED: RIGHT MEDIAL BREAST 4-5:00 REGION
LOCATION DETAILED: LEFT MEDIAL BREAST 10-11:00 REGION
LOCATION DETAILED: RIGHT SUPERIOR ANTERIOR NECK

## 2023-04-07 ASSESSMENT — LOCATION ZONE DERM
LOCATION ZONE: TRUNK
LOCATION ZONE: NECK

## 2023-04-07 ASSESSMENT — LOCATION SIMPLE DESCRIPTION DERM
LOCATION SIMPLE: RIGHT BREAST
LOCATION SIMPLE: LEFT BREAST
LOCATION SIMPLE: LEFT ANTERIOR NECK
LOCATION SIMPLE: RIGHT ANTERIOR NECK
LOCATION SIMPLE: ABDOMEN

## 2023-04-07 NOTE — PROCEDURE: LIQUID NITROGEN
Duration Of Freeze Thaw-Cycle (Seconds): 5-10
Medical Necessity Information: It is in your best interest to select a reason for this procedure from the list below. All of these items fulfill various CMS LCD requirements except the new and changing color options.
Render Note In Bullet Format When Appropriate: No
Show Topical Anesthesia Variable?: Yes
Post-Care Instructions: I reviewed with the patient in detail post-care instructions. Patient is to wear sunprotection, and avoid picking at any of the treated lesions. Pt may apply Vaseline to crusted or scabbing areas.
Consent: The patient's consent was obtained including but not limited to risks of crusting, scabbing, blistering, scarring, darker or lighter pigmentary change, recurrence, incomplete removal and infection.
Medical Necessity Clause: This procedure was medically necessary because the lesions that were treated were:
Detail Level: Detailed
Number Of Freeze-Thaw Cycles: 1 freeze-thaw cycle

## 2023-04-07 NOTE — PROCEDURE: SKIN TAG REMOVAL (COSMETIC)
Price (Use Numbers Only, No Special Characters Or $): 150.00
Hemostasis: Pressure
Removed With: gradle excision
Detail Level: Detailed
Anesthesia Type: 1% lidocaine with epinephrine
Consent: Written consent obtained and the risks of skin tag removal was reviewed with the patient including but not limited to bleeding, pigmentary change, infection, pain, and remote possibility of scarring.

## 2023-04-07 NOTE — PROCEDURE: ADDITIONAL NOTES
Detail Level: Zone
Render Risk Assessment In Note?: yes
Additional Notes: Recommended to pt to have biopsy done on ISK on L neck. Pt declined at today's visit. Elected cryotherapy instead. Pt advised to come back in 6 weeks for recheck on ISK frozen on L neck. If still present, pt to have biopsy. Pt scheduled for 6 week f/u

## 2023-08-24 ENCOUNTER — OFFICE VISIT (OUTPATIENT)
Dept: INTERNAL MEDICINE CLINIC | Facility: CLINIC | Age: 48
End: 2023-08-24

## 2023-08-24 ENCOUNTER — LAB ENCOUNTER (OUTPATIENT)
Dept: LAB | Facility: REFERENCE LAB | Age: 48
End: 2023-08-24
Attending: INTERNAL MEDICINE
Payer: COMMERCIAL

## 2023-08-24 VITALS
HEIGHT: 68 IN | SYSTOLIC BLOOD PRESSURE: 187 MMHG | DIASTOLIC BLOOD PRESSURE: 113 MMHG | BODY MASS INDEX: 21.67 KG/M2 | HEART RATE: 76 BPM | WEIGHT: 143 LBS

## 2023-08-24 DIAGNOSIS — F41.1 GAD (GENERALIZED ANXIETY DISORDER): ICD-10-CM

## 2023-08-24 DIAGNOSIS — Z13.6 SCREENING, ISCHEMIC HEART DISEASE: ICD-10-CM

## 2023-08-24 DIAGNOSIS — Z71.6 ENCOUNTER FOR TOBACCO USE CESSATION COUNSELING: ICD-10-CM

## 2023-08-24 DIAGNOSIS — E55.9 VITAMIN D DEFICIENCY: ICD-10-CM

## 2023-08-24 DIAGNOSIS — Z12.31 ENCOUNTER FOR SCREENING MAMMOGRAM FOR BREAST CANCER: ICD-10-CM

## 2023-08-24 DIAGNOSIS — Z00.00 ANNUAL PHYSICAL EXAM: Primary | ICD-10-CM

## 2023-08-24 DIAGNOSIS — J44.9 COPD WITH ASTHMA (HCC): ICD-10-CM

## 2023-08-24 DIAGNOSIS — Z12.11 COLON CANCER SCREENING: ICD-10-CM

## 2023-08-24 DIAGNOSIS — I10 PRIMARY HYPERTENSION: ICD-10-CM

## 2023-08-24 PROBLEM — J44.89 COPD WITH ASTHMA (HCC): Status: ACTIVE | Noted: 2023-08-24

## 2023-08-24 PROBLEM — J44.89 COPD WITH ASTHMA: Status: ACTIVE | Noted: 2023-08-24

## 2023-08-24 LAB
ALBUMIN SERPL-MCNC: 3.9 G/DL (ref 3.4–5)
ALBUMIN/GLOB SERPL: 1.1 {RATIO} (ref 1–2)
ALP LIVER SERPL-CCNC: 51 U/L
ALT SERPL-CCNC: 20 U/L
ANION GAP SERPL CALC-SCNC: 3 MMOL/L (ref 0–18)
AST SERPL-CCNC: 16 U/L (ref 15–37)
BILIRUB SERPL-MCNC: 0.4 MG/DL (ref 0.1–2)
BUN BLD-MCNC: 11 MG/DL (ref 7–18)
CALCIUM BLD-MCNC: 8.7 MG/DL (ref 8.5–10.1)
CHLORIDE SERPL-SCNC: 107 MMOL/L (ref 98–112)
CHOLEST SERPL-MCNC: 248 MG/DL (ref ?–200)
CO2 SERPL-SCNC: 28 MMOL/L (ref 21–32)
CREAT BLD-MCNC: 0.73 MG/DL
EGFRCR SERPLBLD CKD-EPI 2021: 103 ML/MIN/1.73M2 (ref 60–?)
ERYTHROCYTE [DISTWIDTH] IN BLOOD BY AUTOMATED COUNT: 13.7 %
EST. AVERAGE GLUCOSE BLD GHB EST-MCNC: 111 MG/DL (ref 68–126)
FASTING PATIENT LIPID ANSWER: YES
FASTING STATUS PATIENT QL REPORTED: YES
GLOBULIN PLAS-MCNC: 3.5 G/DL (ref 2.8–4.4)
GLUCOSE BLD-MCNC: 93 MG/DL (ref 70–99)
HBA1C MFR BLD: 5.5 % (ref ?–5.7)
HCT VFR BLD AUTO: 41.2 %
HDLC SERPL-MCNC: 57 MG/DL (ref 40–59)
HGB BLD-MCNC: 13.8 G/DL
LDLC SERPL CALC-MCNC: 165 MG/DL (ref ?–100)
MCH RBC QN AUTO: 30.9 PG (ref 26–34)
MCHC RBC AUTO-ENTMCNC: 33.5 G/DL (ref 31–37)
MCV RBC AUTO: 92.4 FL
NONHDLC SERPL-MCNC: 191 MG/DL (ref ?–130)
OSMOLALITY SERPL CALC.SUM OF ELEC: 285 MOSM/KG (ref 275–295)
PLATELET # BLD AUTO: 254 10(3)UL (ref 150–450)
POTASSIUM SERPL-SCNC: 4.4 MMOL/L (ref 3.5–5.1)
PROT SERPL-MCNC: 7.4 G/DL (ref 6.4–8.2)
RBC # BLD AUTO: 4.46 X10(6)UL
SODIUM SERPL-SCNC: 138 MMOL/L (ref 136–145)
TRIGL SERPL-MCNC: 145 MG/DL (ref 30–149)
TSI SER-ACNC: 0.94 MIU/ML (ref 0.36–3.74)
VIT D+METAB SERPL-MCNC: 36.8 NG/ML (ref 30–100)
VLDLC SERPL CALC-MCNC: 29 MG/DL (ref 0–30)
WBC # BLD AUTO: 6.8 X10(3) UL (ref 4–11)

## 2023-08-24 PROCEDURE — 85027 COMPLETE CBC AUTOMATED: CPT | Performed by: INTERNAL MEDICINE

## 2023-08-24 PROCEDURE — 80061 LIPID PANEL: CPT | Performed by: INTERNAL MEDICINE

## 2023-08-24 PROCEDURE — 83036 HEMOGLOBIN GLYCOSYLATED A1C: CPT | Performed by: INTERNAL MEDICINE

## 2023-08-24 PROCEDURE — 3080F DIAST BP >= 90 MM HG: CPT | Performed by: INTERNAL MEDICINE

## 2023-08-24 PROCEDURE — 82306 VITAMIN D 25 HYDROXY: CPT | Performed by: INTERNAL MEDICINE

## 2023-08-24 PROCEDURE — 99386 PREV VISIT NEW AGE 40-64: CPT | Performed by: INTERNAL MEDICINE

## 2023-08-24 PROCEDURE — 99213 OFFICE O/P EST LOW 20 MIN: CPT | Performed by: INTERNAL MEDICINE

## 2023-08-24 PROCEDURE — 80053 COMPREHEN METABOLIC PANEL: CPT | Performed by: INTERNAL MEDICINE

## 2023-08-24 PROCEDURE — 84443 ASSAY THYROID STIM HORMONE: CPT | Performed by: INTERNAL MEDICINE

## 2023-08-24 PROCEDURE — 3077F SYST BP >= 140 MM HG: CPT | Performed by: INTERNAL MEDICINE

## 2023-08-24 PROCEDURE — 36415 COLL VENOUS BLD VENIPUNCTURE: CPT | Performed by: INTERNAL MEDICINE

## 2023-08-24 PROCEDURE — 3008F BODY MASS INDEX DOCD: CPT | Performed by: INTERNAL MEDICINE

## 2023-08-24 RX ORDER — BUSPIRONE HYDROCHLORIDE 5 MG/1
5 TABLET ORAL EVERY 8 HOURS PRN
Qty: 90 TABLET | Refills: 0 | Status: SHIPPED | OUTPATIENT
Start: 2023-08-24

## 2023-08-24 RX ORDER — PREDNISONE 20 MG/1
20 TABLET ORAL 2 TIMES DAILY WITH MEALS
Qty: 14 TABLET | Refills: 0 | Status: SHIPPED | OUTPATIENT
Start: 2023-08-24 | End: 2023-08-31

## 2023-08-24 RX ORDER — AMLODIPINE BESYLATE 5 MG/1
5 TABLET ORAL DAILY
Qty: 90 TABLET | Refills: 3 | Status: SHIPPED | OUTPATIENT
Start: 2023-08-24

## 2023-08-24 RX ORDER — SERTRALINE HYDROCHLORIDE 100 MG/1
200 TABLET, FILM COATED ORAL DAILY
Qty: 180 TABLET | Refills: 9 | Status: SHIPPED | OUTPATIENT
Start: 2023-08-24

## 2023-08-24 RX ORDER — FLUTICASONE PROPIONATE AND SALMETEROL 250; 50 UG/1; UG/1
1 POWDER RESPIRATORY (INHALATION) EVERY 12 HOURS SCHEDULED
Qty: 3 EACH | Refills: 9 | Status: SHIPPED | OUTPATIENT
Start: 2023-08-24

## 2023-08-24 RX ORDER — DOXYCYCLINE HYCLATE 100 MG/1
100 CAPSULE ORAL 2 TIMES DAILY WITH MEALS
Qty: 10 CAPSULE | Refills: 0 | Status: SHIPPED | OUTPATIENT
Start: 2023-08-24 | End: 2023-08-29

## 2023-08-24 RX ORDER — LORAZEPAM 0.5 MG/1
0.5 TABLET ORAL EVERY 12 HOURS PRN
Qty: 30 TABLET | Refills: 1 | Status: SHIPPED | OUTPATIENT
Start: 2023-08-24

## 2023-08-24 RX ORDER — NICOTINE 21 MG/24HR
1 PATCH, TRANSDERMAL 24 HOURS TRANSDERMAL EVERY 24 HOURS
Qty: 30 EACH | Refills: 0 | Status: SHIPPED | OUTPATIENT
Start: 2023-08-24

## 2023-09-21 DIAGNOSIS — F41.1 GAD (GENERALIZED ANXIETY DISORDER): ICD-10-CM

## 2023-09-22 NOTE — TELEPHONE ENCOUNTER
Please review; protocol failed. Requested Prescriptions   Pending Prescriptions Disp Refills    BUSPIRONE 5 MG Oral Tab [Pharmacy Med Name: Buspirone Hydrochloride 5 Mg Tab Teva] 90 tablet 0     Sig: Take 1 tablet  by mouth every 8  hours as needed (anxiety).        There is no refill protocol information for this order              Recent Outpatient Visits              4 weeks ago Annual physical exam    [address] [facility], Daphnie Lowe MD    Office Visit    8 months ago Encounter for gynecological examination with abnormal finding    [facility], Mary Patches, DO    Office Visit    1 year ago Wellness examination    Cheko Hernandez MD    Office Visit    2 years ago Encounter for gynecological examination without abnormal finding    [address], [address] [facility], [place] Patches, DO    Office Visit    3 years ago Exposure to 2019 novel coronavirus    Booker Moy MD    [facility]

## 2023-09-29 NOTE — TELEPHONE ENCOUNTER
2nd attempt - lmtcb on cell phone; see note below to schedule w/ Dr Marjorie Garzon if call returned. Tried work phone, no voicemail.

## 2023-09-29 NOTE — TELEPHONE ENCOUNTER
Patient is MyChart active but she is not reading the messages regarding the appointment issue. DR Das's last office visit note 8/24/23; Follow Up:   Return in about 4 weeks (around 9/21/2023) for aniety/BP/asthma ffup. No future appointments. RN will postpone the encounter for 2 more days,if no appointment by Monday, please send no phone response letter and send the encounter back to DR Dali Gavin. Thanks.

## 2023-09-30 NOTE — TELEPHONE ENCOUNTER
No protocol for requested medication. Please advise on refill request.      Requested Prescriptions     Pending Prescriptions Disp Refills    BUSPIRONE 5 MG Oral Tab [Pharmacy Med Name: Buspirone Hydrochloride 5 Mg Tab Teva] 90 tablet 0     Sig: Take 1 tablet  by mouth every 8  hours as needed (anxiety). Recent Visits  Date Type Provider Dept   08/24/23 Office Visit Dmitry Kaufman MD [facility]   09/21/22 Office Visit Jordan Henderson MD [facility]   Showing recent visits within past 540 days with a meds authorizing provider and meeting all other requirements  Future Appointments  No visits were found meeting these conditions. Showing future appointments within next 150 days with a meds authorizing provider and meeting all other requirements     Requested Prescriptions   Pending Prescriptions Disp Refills    BUSPIRONE 5 MG Oral Tab [Pharmacy Med Name: Buspirone Hydrochloride 5 Mg Tab Teva] 90 tablet 0     Sig: Take 1 tablet  by mouth every 8  hours as needed (anxiety).        There is no refill protocol information for this order            Recent Outpatient Visits              1 month ago Annual physical exam    Dottie Hernández MD    Office Visit    8 months ago Encounter for gynecological examination with abnormal finding    [facility] Chan Mackenzie, DO    Office Visit    1 year ago Wellness examination    Saman Raymundo MD    Office Visit    2 years ago Encounter for gynecological examination without abnormal finding    [address] [facility],[address], [address] [facility], Chan Mackenzie, DO    Office Visit    3 years ago Exposure to 2019 novel coronavirus    Parish Romano MD    [facility]

## 2023-10-02 NOTE — TELEPHONE ENCOUNTER
No answer, left detailed message to call back or schedule follow up through [employer].  If pt calls back please schedule appt

## 2023-10-23 RX ORDER — BUSPIRONE HYDROCHLORIDE 5 MG/1
5 TABLET ORAL 3 TIMES DAILY
Qty: 90 TABLET | Refills: 1 | OUTPATIENT
Start: 2023-10-23

## 2023-12-13 ENCOUNTER — TELEMEDICINE (OUTPATIENT)
Facility: LOCATION | Age: 48
End: 2023-12-13
Payer: COMMERCIAL

## 2023-12-13 DIAGNOSIS — H10.32 ACUTE CONJUNCTIVITIS OF LEFT EYE, UNSPECIFIED ACUTE CONJUNCTIVITIS TYPE: Primary | ICD-10-CM

## 2023-12-13 PROCEDURE — 99213 OFFICE O/P EST LOW 20 MIN: CPT | Performed by: INTERNAL MEDICINE

## 2023-12-13 RX ORDER — ERYTHROMYCIN 5 MG/G
1 OINTMENT OPHTHALMIC NIGHTLY
Qty: 3.5 G | Refills: 1 | Status: SHIPPED | OUTPATIENT
Start: 2023-12-13 | End: 2023-12-18

## 2023-12-13 NOTE — PROGRESS NOTES
TELEHEALTH VIDEO VISIT    I spoke with Meka Abdi by secure Epic  video service on 12/13/23, verified date of birth, patient stated they are in the state of [place], and discussed their concerns as below:    Conjunctivitis  This is a new problem. The current episode started in the past 7 days. The problem occurs constantly. The problem has been gradually worsening. Associated symptoms include a visual change. Nothing aggravates the symptoms. She has tried nothing for the symptoms. The treatment provided no relief. There were no vitals filed for this visit. There is no height or weight on file to calculate BMI. BP Readings from Last 3 Encounters:   08/24/23 (!) 187/113   01/04/23 (!) 180/100   09/21/22 132/84     The 10-year ASCVD risk score (Jayro BARKLEY, et al., 2019) is: 10.8%    Values used to calculate the score:      Age: 57 years      Sex: Female      Is Non- : No      Diabetic: No      Tobacco smoker: Yes      Systolic Blood Pressure: 483 mmHg      Is BP treated: Yes      HDL Cholesterol: 57 mg/dL      Total Cholesterol: 248 mg/dL  Reviewed Current Medications:  Current Outpatient Medications   Medication Sig Dispense Refill    erythromycin 5 MG/GM Ophthalmic Ointment Place 1 Application into the left eye nightly for 5 days. 3.5 g 1    rosuvastatin 10 MG Oral Tab Take 1 tablet (10 mg total) by mouth nightly. FOR CHOLESTEROL. 90 tablet 9    sertraline 100 MG Oral Tab Take 2 tablets (200 mg total) by mouth daily. FOR ANXIETY. 180 tablet 9    busPIRone 5 MG Oral Tab Take 1 tablet (5 mg total) by mouth every 8 (eight) hours as needed (anxiety). 90 tablet 0    LORazepam 0.5 MG Oral Tab Take 1 tablet (0.5 mg total) by mouth every 12 (twelve) hours as needed for Anxiety. 30 tablet 1    nicotine 14 MG/24HR Transdermal Patch 24 Hr Place 1 patch onto the skin daily. 30 each 0    amLODIPine 5 MG Oral Tab Take 1 tablet (5 mg total) by mouth daily.  START IF YOUR HOME BLOOD PRESSURE ARE GREATER THAN 140/90 CONSISTENTLY. 90 tablet 3    fluticasone-salmeterol 250-50 MCG/ACT Inhalation Aerosol Powder, Breath Activated Inhale 1 puff into the lungs every 12 (twelve) hours. 3 each 9    valACYclovir (VALTREX) 500 MG Oral Tab Take 1 tablet (500 mg total) by mouth daily. 90 tablet 3       Assessment & Plan    1. Acute conjunctivitis of left eye, unspecified acute conjunctivitis type (Primary)  -     Erythromycin; Place 1 Application into the left eye nightly for 5 days. Dispense: 3.5 g; Refill: 1  Plan  Unclear etiology, does not appear to be infectious however it has been worsening, have asked her to use, warm compresses, no contacts and monitor for the next day or so and if it continues to worsen to start erythromycin topical, if she develops any vesicular lesions she is to see me right away and possibly ophthalmology, if her symptoms improve without topical erythromycin then no need. Follow Up: As needed/if symptoms worsen    Objective  Physical Exam  Nursing note reviewed. Constitutional:       General: She is not in acute distress. Appearance: Normal appearance. HENT:      Head: Normocephalic and atraumatic. Right Ear: External ear normal.      Left Ear: External ear normal.      Nose: Nose normal.   Eyes:      Conjunctiva/sclera: Conjunctivae normal.      Right eye: Right conjunctiva is not injected. Left eye: Left conjunctiva is not injected. Comments: Erythematous inferior orbit   Pulmonary:      Effort: Pulmonary effort is normal. No respiratory distress. Musculoskeletal:      Cervical back: Normal range of motion and neck supple. Skin:     Coloration: Skin is not jaundiced. Neurological:      General: No focal deficit present. Mental Status: She is alert and oriented to person, place, and time. Mental status is at baseline. Psychiatric:         Mood and Affect: Mood normal.         Thought Content:  Thought content normal. Reviewed:  Patient Active Problem List    Diagnosis    Primary hypertension    COPD with asthma    HSV infection    Elevated blood pressure reading in office without diagnosis of hypertension    Request for sterilization    NELLA (generalized anxiety disorder)    Left foot pain    Acute left ankle pain    Menorrhagia with regular cycle    Dyslipidemia    Other fatigue    Easy bruising    Skin tag    Seborrheic keratosis    Anxiety    Panic attacks    Insomnia    Nicotine dependence    Chronic mucoid otitis media    Dysuria      No Known Allergies     Social History     Socioeconomic History    Marital status: Single   Tobacco Use    Smoking status: Every Day     Packs/day: 0.50     Years: 15.00     Additional pack years: 0.00     Total pack years: 7.50     Types: Cigarettes     Passive exposure: Current    Smokeless tobacco: Current    Tobacco comments:     not motivated to quit at this time   Vaping Use    Vaping Use: Never used   Substance and Sexual Activity    Alcohol use: Yes     Alcohol/week: 1.0 - 2.0 standard drink of alcohol     Types: 1 - 2 Glasses of wine per week     Comment: weekly    Drug use: No    Sexual activity: Yes     Partners: Male     Comment: none   Other Topics Concern    Caffeine Concern Yes     Comment: coffee 3 cups daily      Review of Systems  All other systems negative unless stated in ROS or in HPI. Norman Hallman MD  Internal Medicine       Call office with any questions or seek emergency care if necessary. Patient understands and agrees to follow directions and advice. Telehealth Verbal Consent   I conducted a telehealth visit with Antoinette Cuevas today, 12/13/23, which was completed using two-way, real-time interactive audio and video communication.  This has been done in good nate to provide continuity of care in the best interest of the provider-patient relationship, due to the COVID -19 public health crisis/national emergency where restrictions of face-to-face office visits are ongoing. Every conscious effort was taken to allow for sufficient and adequate time to complete the visit. The patient was made aware of the limitations of the telehealth visit, including treatment limitations as no physical exam could be performed. The patient was advised to call 911 or to go to the ER in case there was an emergency. The patient was also advised of the potential privacy & security concerns related to the telehealth platform. The patient was made aware of where to find [facility] notice of privacy practices, telehealth consent form and other related consent forms and documents. which are located on the [facility] website. The patient verbally agreed to telehealth consent form, related consents and the risks discussed. Lastly, the patient confirmed that they were in [place]. Included in this visit, time may have been spent reviewing labs, medications, radiology tests and decision making. Appropriate medical decision-making and tests are ordered as detailed in the plan of care above. Coding/billing information is submitted for this visit based on complexity of care and/or time spent for the visit.  ----------------------------------------- PATIENT INSTRUCTIONS-----------------------------------------     There are no Patient Instructions on file for this visit.

## 2024-02-08 ENCOUNTER — OFFICE VISIT (OUTPATIENT)
Facility: LOCATION | Age: 49
End: 2024-02-08

## 2024-02-08 VITALS
HEIGHT: 68 IN | HEART RATE: 77 BPM | WEIGHT: 140 LBS | OXYGEN SATURATION: 98 % | BODY MASS INDEX: 21.22 KG/M2 | SYSTOLIC BLOOD PRESSURE: 169 MMHG | DIASTOLIC BLOOD PRESSURE: 100 MMHG

## 2024-02-08 DIAGNOSIS — M54.16 LUMBAR RADICULOPATHY: Primary | ICD-10-CM

## 2024-02-08 DIAGNOSIS — E78.5 DYSLIPIDEMIA: ICD-10-CM

## 2024-02-08 DIAGNOSIS — I10 PRIMARY HYPERTENSION: ICD-10-CM

## 2024-02-08 PROCEDURE — 99214 OFFICE O/P EST MOD 30 MIN: CPT | Performed by: INTERNAL MEDICINE

## 2024-02-08 PROCEDURE — 3008F BODY MASS INDEX DOCD: CPT | Performed by: INTERNAL MEDICINE

## 2024-02-08 PROCEDURE — 3080F DIAST BP >= 90 MM HG: CPT | Performed by: INTERNAL MEDICINE

## 2024-02-08 PROCEDURE — 3077F SYST BP >= 140 MM HG: CPT | Performed by: INTERNAL MEDICINE

## 2024-02-08 RX ORDER — CYCLOBENZAPRINE HCL 5 MG
TABLET ORAL 3 TIMES DAILY PRN
Qty: 90 TABLET | Refills: 1 | Status: SHIPPED | OUTPATIENT
Start: 2024-02-08

## 2024-02-08 RX ORDER — ROSUVASTATIN CALCIUM 10 MG/1
10 TABLET, COATED ORAL NIGHTLY
Qty: 90 TABLET | Refills: 9 | Status: SHIPPED | OUTPATIENT
Start: 2024-02-08

## 2024-02-08 RX ORDER — PREDNISONE 10 MG/1
TABLET ORAL
Qty: 18 TABLET | Refills: 0 | Status: SHIPPED | OUTPATIENT
Start: 2024-02-08 | End: 2024-02-16

## 2024-02-08 RX ORDER — AMLODIPINE BESYLATE 5 MG/1
5 TABLET ORAL DAILY
Qty: 90 TABLET | Refills: 9 | Status: SHIPPED | OUTPATIENT
Start: 2024-02-08

## 2024-02-08 RX ORDER — TELMISARTAN 40 MG/1
40 TABLET ORAL NIGHTLY
Qty: 90 TABLET | Refills: 9 | Status: SHIPPED | OUTPATIENT
Start: 2024-02-08

## 2024-02-08 NOTE — PROGRESS NOTES
INTERNAL MEDICINE OFFICE NOTE     Patient ID: Rocio Escobar is a 48 year old female.  Today's Date: 02/08/24  Chief Complaint: Pain (Pt here for Shooting Pain in right hip and groin and down leg. Started on Monday and has gotten worse )    Pain      1.  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  She states that there is no numbness tingling but it is causing quite a bit of discomfort.  She has a hard time sitting and standing due to the pain.  She has tried over-the-counter treatments out any improvement.  There is no urinary complaints.    2.  She has hypertension that is somewhat improved with amlodipine 5 mg, she states that home blood pressures are usually in the 130s to 140s, advised that ambulatory pressures in office pressures are drastically different, continue amlodipine but I have asked her to check blood pressures at home.    3.  She has dyslipidemia currently on rosuvastatin, she is due for repeat labs but would like to wait for her follow-up.    Vitals:    02/08/24 0958   BP: (!) 169/100   Pulse: 77   SpO2: 98%   Weight: 140 lb (63.5 kg)   Height: 5' 8\" (1.727 m)     body mass index is 21.29 kg/m².  BP Readings from Last 3 Encounters:   02/08/24 (!) 169/100   08/24/23 (!) 187/113   01/04/23 (!) 180/100     The 10-year ASCVD risk score (Jayro BARKLEY, et al., 2019) is: 8.9%    Values used to calculate the score:      Age: 48 years      Sex: Female      Is Non- : No      Diabetic: No      Tobacco smoker: Yes      Systolic Blood Pressure: 169 mmHg      Is BP treated: Yes      HDL Cholesterol: 57 mg/dL      Total Cholesterol: 248 mg/dL  Medications reviewed:  Current Outpatient Medications   Medication Sig Dispense Refill    cyclobenzaprine 5 MG Oral Tab Take 1-2 tablets (5-10 mg total) by mouth 3 (three) times daily as needed for Muscle spasms. May cause sedation; no driving. 90 tablet 1     predniSONE 10 MG Oral Tab Take 3 tablets (30 mg total) by mouth daily for 3 days, THEN 2 tablets (20 mg total) daily for 3 days, THEN 1 tablet (10 mg total) daily for 3 days. TAKE WITH FOOD.. 18 tablet 0    amLODIPine 5 MG Oral Tab Take 1 tablet (5 mg total) by mouth daily. START IF YOUR HOME BLOOD PRESSURE ARE GREATER THAN 140/90 CONSISTENTLY. 90 tablet 9    rosuvastatin 10 MG Oral Tab Take 1 tablet (10 mg total) by mouth nightly. FOR CHOLESTEROL. 90 tablet 9    telmisartan 40 MG Oral Tab Take 1 tablet (40 mg total) by mouth nightly. FOR BLOOD PRESSURE. 90 tablet 9    sertraline 100 MG Oral Tab Take 2 tablets (200 mg total) by mouth daily. FOR ANXIETY. 180 tablet 9    busPIRone 5 MG Oral Tab Take 1 tablet (5 mg total) by mouth every 8 (eight) hours as needed (anxiety). 90 tablet 0    LORazepam 0.5 MG Oral Tab Take 1 tablet (0.5 mg total) by mouth every 12 (twelve) hours as needed for Anxiety. 30 tablet 1    nicotine 14 MG/24HR Transdermal Patch 24 Hr Place 1 patch onto the skin daily. 30 each 0    fluticasone-salmeterol 250-50 MCG/ACT Inhalation Aerosol Powder, Breath Activated Inhale 1 puff into the lungs every 12 (twelve) hours. 3 each 9    valACYclovir (VALTREX) 500 MG Oral Tab Take 1 tablet (500 mg total) by mouth daily. 90 tablet 3         Assessment & Plan    1. Lumbar radiculopathy (Primary)  -     Cyclobenzaprine HCl; Take 1-2 tablets (5-10 mg total) by mouth 3 (three) times daily as needed for Muscle spasms. May cause sedation; no driving.  Dispense: 90 tablet; Refill: 1  -     predniSONE; Take 3 tablets (30 mg total) by mouth daily for 3 days, THEN 2 tablets (20 mg total) daily for 3 days, THEN 1 tablet (10 mg total) daily for 3 days. TAKE WITH FOOD..  Dispense: 18 tablet; Refill: 0  -     PHYSICAL THERAPY - INTERNAL  2. Primary hypertension  -     amLODIPine Besylate; Take 1 tablet (5 mg total) by mouth daily. START IF YOUR HOME BLOOD PRESSURE ARE GREATER THAN 140/90 CONSISTENTLY.  Dispense: 90 tablet;  Refill: 9  -     Telmisartan; Take 1 tablet (40 mg total) by mouth nightly. FOR BLOOD PRESSURE.  Dispense: 90 tablet; Refill: 9  3. Dyslipidemia  -     Rosuvastatin Calcium; Take 1 tablet (10 mg total) by mouth nightly. FOR CHOLESTEROL.  Dispense: 90 tablet; Refill: 9  Plan  Start cyclobenzaprine along with prednisone for the lumbar radiculopathy, once improved by vascular start some formal physical therapy to help prevent this.  For blood pressure continue amlodipine but add in telmisartan 40, blood pressure goal less than 130/80, she will keep me updated on her progress.  Continue with rosuvastatin and will repeat labs at next visit.    Follow Up: Return in about 4 weeks (around 3/7/2024) for BLOOD PRESSURE; TRACK AT HOME, BRING RESULTS, OFFICE OR VIDEO VISIT-..         Objective: Results:   Physical Exam  Vitals and nursing note reviewed.   Constitutional:       General: She is not in acute distress.     Appearance: Normal appearance.   HENT:      Head: Normocephalic.      Right Ear: External ear normal.      Left Ear: External ear normal.   Eyes:      Extraocular Movements: Extraocular movements intact.      Conjunctiva/sclera: Conjunctivae normal.   Pulmonary:      Effort: Pulmonary effort is normal.   Musculoskeletal:      Cervical back: Normal range of motion and neck supple.      Lumbar back: Spasms and tenderness present. No bony tenderness. Positive right straight leg raise test.   Skin:     Coloration: Skin is not jaundiced.   Neurological:      General: No focal deficit present.      Mental Status: She is alert and oriented to person, place, and time. Mental status is at baseline.   Psychiatric:         Mood and Affect: Mood normal.         Behavior: Behavior normal.        Reviewed:    Patient Active Problem List    Diagnosis    Primary hypertension    COPD with asthma    HSV infection    Elevated blood pressure reading in office without diagnosis of hypertension    Request for sterilization    NELLA  (generalized anxiety disorder)    Left foot pain    Acute left ankle pain    Menorrhagia with regular cycle    Dyslipidemia    Other fatigue    Easy bruising    Skin tag    Seborrheic keratosis    Anxiety    Panic attacks    Insomnia    Nicotine dependence    Chronic mucoid otitis media    Dysuria      No Known Allergies     Social History     Socioeconomic History    Marital status: Single   Tobacco Use    Smoking status: Every Day     Packs/day: 0.50     Years: 15.00     Additional pack years: 0.00     Total pack years: 7.50     Types: Cigarettes     Passive exposure: Current    Smokeless tobacco: Current    Tobacco comments:     not motivated to quit at this time   Vaping Use    Vaping Use: Never used   Substance and Sexual Activity    Alcohol use: Yes     Alcohol/week: 1.0 - 2.0 standard drink of alcohol     Types: 1 - 2 Glasses of wine per week     Comment: weekly    Drug use: No    Sexual activity: Yes     Partners: Male     Comment: none   Other Topics Concern    Caffeine Concern Yes     Comment: coffee 3 cups daily      Review of Systems  All other systems negative unless otherwise stated in ROS or HPI above.       Garry Das MD  Internal Medicine       Call office with any questions or seek emergency care if necessary.   Patient understands and agrees to follow directions and advice.      ----------------------------------------- PATIENT INSTRUCTIONS-----------------------------------------   .    Patient Instructions       Please purchase a blood pressure monitor, if you don't already own one, and record your blood pressure and heart rate 1-2 times daily on the provided log. The more values you provide at the end of the month the easier it will be to adjust your medications, if necessary.    You can purchase a simple but good quality blood pressure monitoring machine for $30-60 depending on where you shop. They are readily available at [employer], [employer], [employer], [employer], [employer], [employer], [employer].  \"[employer]ron  Bronze Blood Pressure Monitor, Upper Arm Cuff, Digital Blood Pressure Machine, Storesup to 14 Readings\" . This is found on [employer] for $39.00. Any model by this company will give you accurate results. This can be found in the stores listed above.   Your blood pressure goal is top number 120-130 mmHg  and bottom number 70-80 mmHg; ie 120-130 mmHg / 70-80 mmHg.  Blood pressure that stays consistently within this range reduces your risk of stroke, heart attack, heart disease, kidney disease, eye disease, and sexual dysfunction.       What is High Blood Pressure?  High blood pressure (also called hypertension) is known as the “silent killer.” This is because most of the time it doesn’t cause symptoms. In fact, many people don’t know they have it until other problems develop. In most cases, high blood pressure often requires lifelong treatment.  Understanding blood pressure  The circulatory system is made up of the heart and blood vessels that carry blood through the body. Your heart is the pump for this system. With each heartbeat (contraction), the heart sends blood out through large blood vessels called arteries. Blood pressure is a measure of how hard the moving blood pushes against the walls of the arteries.  High blood pressure can harm your health  In a healthy blood vessel, the blood moves smoothly through the vessel and puts normal pressure on the vessel walls.       High blood pressure occurs when blood pushes too hard against artery walls. This causes damage to the artery walls and then the formation of scar tissue as it heals. This makes the arteries stiff and weak. Plaque sticks to the scarred tissue narrowing and hardening the arteries. High blood pressure also causes your heart to work harder to get blood out to the body. High blood pressure raises your risk of heart attack, also known as acute myocardial infarction, or AMI, heart failure, and stroke. It can also lead to kidney disease, and blindness. In  general, if you have high blood pressure, keeping your blood pressure below 130/80 mmHg may help prevent these problems. Your healthcare provider may prescribe medicine to help control blood pressure if lifestyle changes are not enough.  It's important to know your blood pressure numbers. Blood pressure measurements are given as 2 numbers. Systolic blood pressure is the upper number. This is the pressure when the heart contracts. Diastolic blood pressure is the lower number. This is the pressure when the heart relaxes between beats.  Blood pressure is categorized as normal, elevated, or stage 1 or stage 2 high blood pressure:  Normal blood pressure is systolic of less than 120 and diastolic of less than 80 (120/80)  Elevated blood pressure is systolic of 120 to 129 and diastolic less than 80  Stage 1 high blood pressure is systolic is 130 to 139 or diastolic between 80 to 89  Stage 2 high blood pressure is when systolic is 140 or higher or the diastolic is 90 or higher  High blood pressure is diagnosed when multiple, separate readings show blood pressure above 130/80 mmHg. Talk with your healthcare provider if you have questions or concerns about your blood pressure readings.  Measuring blood pressure  An example of a blood pressure measurement is 120/70 (120 over 70). The top number is the pressure of blood against the artery walls during a heartbeat (systolic). The bottom number is the pressure of blood against artery walls between heartbeats (diastolic). Talk with your healthcare provider to find out what your blood pressure goals should be.   Controlling blood pressure  If your blood pressure is too high, work with your doctor on a plan for lowering it. Below are steps you can take that will help lower your blood pressure.  Choose heart-healthy foods. Eating healthier meals helps you control your blood pressure. Ask your doctor about the DASH eating plan. This plan helps reduce blood pressure by limiting the  amount of sodium (salt) you have in your diet. DASH also encourages eating plenty of fruits and vegetables, low-fat or non-fat dairy, whole-grains, and foods high in fiber, and low in fat. This also provides an enhanced amount of potassium which can also help lower blood pressure.  Reduce sodium. Reducing sodium in your diet reduces fluid retention. Fluid retention caused by too much salt increases blood volume and blood pressure. The American Heart Association’s (AHA) \"ideal\" sodium intake recommendation is 1,500 milligrams per day.  However, since American's eat so much salt, the AHA says a positive change can occur by cutting back to even 2,400 milligrams of sodium a day.  Maintain a healthy weight. Being overweight makes you more likely to have high blood pressure. Losing excess weight helps lower blood pressure.  Exercise regularly. Daily exercise helps your heart and blood vessels work better and stay healthier. It can help lower your blood pressure.  Stop smoking. Smoking increases blood pressure and damages blood vessels.  Limit alcohol. Drinking too much alcohol can raise blood pressure. Men should have no more than 2 drinks a day. Women should have no more than 1. (A drink is equal to 1 beer, or a small glass of wine, or a shot of liquor.)  Control stress. Stress makes your heart work harder and beat faster. Controlling stress helps you control your blood pressure.  Facts about high blood pressure  Feeling OK does not mean that blood pressure is under control. Likewise, feeling bad doesn’t mean it’s out of control. The only way to know for sure is to check your pressure regularly.  Medicine is only one part of controlling high blood pressure. You also need to manage your weight, get regular exercise, and adjust your eating habits.  High blood pressure is usually a lifelong problem. But it can be controlled with healthy lifestyle changes and medicine.  Hypertension is not the same as stress. Although stress  may be a factor in high blood pressure, it’s only one part of the story.  Blood pressure medicines need to be taken every day. Stopping suddenly may cause a dangerous increase in pressure.  Uncontrolled High Blood Pressure  Your blood pressure was unusually high today. This can occur if you’ve missed doses of your blood pressure medicine. Or it can happen if you are taking other medicines. These include some asthma inhalers, decongestants, diet pills, and street drugs like cocaine and amphetamine.  Other causes include:  Weight gain  More salt in your diet  Smoking  Caffeine  Your blood pressure can also rise if you are emotionally upset or in intense pain. It may go back to normal after a period of rest.  Blood pressure measurements are given as 2 numbers. Systolic blood pressure is the upper number. This is the pressure when the heart contracts. Diastolic blood pressure is the lower number. This is the pressure when the heart relaxes between beats. You will see your blood pressure readings written together. For example, a person with a systolic pressure of 118 and a diastolic pressure of 78 will have 118/78 written in the medical record. To be high blood pressure, the numbers must be higher when tested over a period of time.  Blood pressure is categorized as normal, elevated, or stage 1 or stage 2 high blood pressure:  Normal blood pressure is systolic of less than 120 and diastolic of less than 80 (120/80)  Elevated blood pressure is systolic of 120 to 129 and diastolic less than 80  Stage 1 high blood pressure is systolic is 130 to 139 or diastolic between 80 to 89  Stage 2 high blood pressure is when systolic is 140 or higher or the diastolic is 90 or higher  Uncontrolled high blood pressure can cause serious health problems. It raises your risk for heart attack, stroke, and heart failure. In general, if you have high blood pressure, keeping your blood pressure below 130/80 mmHg may help prevent these problems.  Your healthcare provider may prescribe medicine to help control blood pressure if lifestyle changes are not enough.  Home care  It’s important to take steps to lower your blood pressure. If you are taking blood pressure medicine, the guidelines below may help you need less or no medicines in the future.  Start a weight-loss program if you are overweight.  Cut back on the amount of salt in your diet:  Avoid high-salt foods like olives, pickles, smoked meats, and salted potato chips.  Don’t add salt to your food at the table.  Use only small amounts of salt when cooking.  Start an exercise program. Talk with your healthcare provider about what exercise program is best for you. It doesn’t have to be difficult. Even brisk walking for 20 minutes 3 times a week is a good form of exercise.  Avoid medicines that stimulates the heart. This includes many over-the-counter cold and sinus decongestant pills and sprays, as well as diet pills. Check the warnings about high blood pressure on the label. Before purchasing any over-the-counter medicines or supplements, always ask the pharmacist about the product's potential interaction with your high blood pressure and your medicines.  Stimulants such as amphetamine or cocaine could be lethal for someone with high blood pressure. Never take these.  Limit how much caffeine you drink. Or switch to noncaffeinated beverages.  Stop smoking. If you are a long-time smoker, this can be hard. Enroll in a stop-smoking program to make it more likely that you will succeed. Talk with your provider about ways to quit.  Learn how to handle stress better. This is an important part of any program to lower blood pressure. Learn ways to relax. These include meditation, yoga, and biofeedback.  If medicines were prescribed, take them exactly as directed. Missing doses may cause your blood pressure to get out of control.  If you miss a dose or doses of your medicines, check with your healthcare provider or  pharmacist about what to do.  Consider buying an automatic blood pressure machine. Your provider may recommend a certain type. You can get one of these at most pharmacies. Measure your blood pressure twice a day, in the morning, and in the late afternoon. Keep a written record of your home blood pressure readings and take the record to your medical appointments.  Here are some additional guidelines on home blood pressure monitoring from the American Heart Association.  Don't smoke or drink coffee for 30 minutes  Go to the bathroom before the test.  Relax for 5 minutes before taking the measurement.  Sit correctly. Be sure your back is supported. Don't sit on a couch or soft chair. Uncross your feet and place them flat on the floor. Place your arm on a solid, flat surface like a table with the upper arm at heart level. Make certain the middle of the cuff is directly above the bend of the elbow. Check the monitor's instruction manual for an illustration.  Take multiple readings. When you measure, take 2 or 3 readings one minute apart and record all of the results.  Take your blood pressure at the same time every day, or as your healthcare provider recommends.  Record the date, time, and blood pressure reading.  Take the record with you to your next appointment. If your blood pressure monitor has a built-in memory, simply take the monitor with you to your next appointment.  Call your provider if you have several high readings. Don't be frightened by a single high reading, but if you get several high readings, check in with your healthcare provider.  Note: When blood pressure reaches a systolic (top number) of 180 or higher or a diastolic (bottom number) of 110 or higher, emergency medical treatment is required. Call your healthcare provider immediately.  Follow-up care  Regular visits to your own healthcare provider for blood pressure and medicine checks are an important part of your care. Make a follow-up appointment  as directed. Bring the record of your home blood pressure readings to the appointment.  When to seek medical advice  Call your healthcare provider right away if any of these occur:  Blood pressure reaches a systolic (top number) of 180 or higher or diastolic (bottom number) of 110 or higher, emergency medical treatment is required.  Chest, arm, shoulder, neck, or upper back pain  Shortness of breath  Severe headache  Throbbing or rushing sound in the ears  Nosebleed  Extreme drowsiness, confusion, or fainting  Dizziness or dizziness with spinning sensation (vertigo)  Weakness in an arm or leg or on one side of the face  Trouble speaking or seeing   Controlling High Blood Pressure  High blood pressure (hypertension) is often called the silent killer. This is because many people who have it, don’t know it. High blood pressure can raise your risk of heart attack, stroke, heart disease, and heart failure. Controlling your blood pressure can decrease your risk of these problems. Know your blood pressure and remember to check it regularly. Doing so can save your life.  Blood pressure measurements are given as 2 numbers. Systolic blood pressure is the upper number. This is the pressure when the heart contracts. Diastolic blood pressure is the lower number. This is the pressure when the heart relaxes between beats.  Blood pressure is categorized as normal, elevated, or stage 1 or stage 2 high blood pressure:  Normal blood pressure is systolic of less than 120 and diastolic of less than 80 (120/80)  Elevated blood pressure is systolic of 120 to 129 and diastolic less than 80  Stage 1 high blood pressure is systolic is 130 to 139 or diastolic between 80 to 89  Stage 2 high blood pressure is when systolic is 140 or higher or the diastolic is 90 or higher  Here are some things you can do to help control your blood pressure.  Choose heart-healthy foods  Select low-salt, low-fat foods. Limit sodium intake to 2,400 mg per day or  the amount suggested by your healthcare provider.  Limit canned, dried, cured, packaged, and fast foods. These can contain a lot of salt.  Eat 8 to 10 servings of fruits and vegetables every day.  Choose lean meats, fish, or chicken.  Eat whole-grain pasta, brown rice, and beans.  Eat 2 to 3 servings of low-fat or fat-free dairy products.  Ask your doctor about the DASH eating plan. This plan helps reduce blood pressure.  When you go to a restaurant, ask that your meal be prepared with no added salt.    Maintain a healthy weight  Ask your healthcare provider how many calories to eat a day. Then stick to that number.  Ask your healthcare provider what weight range is healthiest for you. If you are overweight, a weight loss of only 3% to 5% of your body weight can help lower blood pressure. Generally, a good weight loss goal is to lose 10% of your body weight in a year.  Limit snacks and sweets.  Get regular exercise.    Get up and get active  Choose activities you enjoy. Find ones you can do with friends or family. This includes bicycling, dancing, walking, and jogging.  Park farther away from building entrances.  Use stairs instead of the elevator.  When you can, walk or bike instead of driving.  [place] leaves, garden, or do household repairs.  Be active at a moderate to vigorous level of physical activity for at least 40 minutes for a minimum of 3 to 4 days a week.     Manage stress  Make time to relax and enjoy life. Find time to laugh.  Communicate your concerns with your loved ones and your healthcare provider.  Visit with family and friends, and keep up with hobbies.    Limit alcohol and quit smoking  Men should have no more than 2 drinks per day.  Women should have no more than 1 drink per day.  Talk with your healthcare provider about quitting smoking. Smoking significantly increases your risk for heart disease and stroke. Ask your healthcare provider about community smoking cessation programs and other  options.    Medicines  If lifestyle changes aren’t enough, your healthcare provider may prescribe high blood pressure medicine. Take all medicines as prescribed. If you have any questions about your medicines, ask your healthcare provider before stopping or changing them.  Low-Salt Choices  Eating salt (sodium) can make your body retain too much water. Excess water makes your heart work harder. Canned, packaged, and frozen foods are easy to prepare. But they are often high in sodium. Here are some ideas for low-salt foods you can easily make yourself.  For breakfast  Fruit or 100% fruit juice  Whole-wheat bread or an English muffin. Look for sodium content on Nutrition Facts labels.  Low-fat milk or yogurt  Unsalted eggs  Shredded wheat  Corn tortillas  Unsalted steamed rice  Regular (not instant) hot cereal, made without salt  Stay away from:  Sausage, jain, and ham  Flour tortillas  Packaged muffins, pancakes, and biscuits  Instant hot cereals  Cottage cheese    For lunch and dinner  Fresh fish, chicken, turkey, or meat--baked, broiled, or roasted without salt  Dry beans, cooked without salt  Tofu, stir-fried without salt  Unsalted fresh fruit and vegetables, or frozen or canned fruit and vegetables with no added salt  Stay away from:  Lunch or deli meat that is cured or smoked  Cheese  Tomato juice and ketchup  Canned vegetables, soups, and fish not labeled as no-salt-added or reduced sodium  Packaged gravies and sauces  Olives, pickles, and relish  Bottled salad dressings    For snacks and desserts  Yogurt  Unsalted, air-popped popcorn  Unsalted nuts or seeds  Stay away from:  Pies and cakes  Packaged dessert mixes  Pizza  Canned and packaged puddings  Pretzels, chips, crackers, and nuts--unless the label says unsalted    Step-by-Step  Checking Your Blood Pressure

## 2024-02-08 NOTE — PATIENT INSTRUCTIONS
Please purchase a blood pressure monitor, if you don't already own one, and record your blood pressure and heart rate 1-2 times daily on the provided log. The more values you provide at the end of the month the easier it will be to adjust your medications, if necessary.    You can purchase a simple but good quality blood pressure monitoring machine for $30-60 depending on where you shop. They are readily available at [employer], [employer], [employer], [employer], [employer], [employer], [facility].  \"[employer]ron Bronze Blood Pressure Monitor, Upper Arm Cuff, Digital Blood Pressure Machine, Storesup to 14 Readings\" . This is found on [employer] for $39.00. Any model by this company will give you accurate results. This can be found in the stores listed above.   Your blood pressure goal is top number 120-130 mmHg  and bottom number 70-80 mmHg; ie 120-130 mmHg / 70-80 mmHg.  Blood pressure that stays consistently within this range reduces your risk of stroke, heart attack, heart disease, kidney disease, eye disease, and sexual dysfunction.       What is High Blood Pressure?  High blood pressure (also called hypertension) is known as the “silent killer.” This is because most of the time it doesn’t cause symptoms. In fact, many people don’t know they have it until other problems develop. In most cases, high blood pressure often requires lifelong treatment.  Understanding blood pressure  The circulatory system is made up of the heart and blood vessels that carry blood through the body. Your heart is the pump for this system. With each heartbeat (contraction), the heart sends blood out through large blood vessels called arteries. Blood pressure is a measure of how hard the moving blood pushes against the walls of the arteries.  High blood pressure can harm your health  In a healthy blood vessel, the blood moves smoothly through the vessel and puts normal pressure on the vessel walls.       High blood pressure occurs when blood pushes too hard against  artery walls. This causes damage to the artery walls and then the formation of scar tissue as it heals. This makes the arteries stiff and weak. Plaque sticks to the scarred tissue narrowing and hardening the arteries. High blood pressure also causes your heart to work harder to get blood out to the body. High blood pressure raises your risk of heart attack, also known as acute myocardial infarction, or AMI, heart failure, and stroke. It can also lead to kidney disease, and blindness. In general, if you have high blood pressure, keeping your blood pressure below 130/80 mmHg may help prevent these problems. Your healthcare provider may prescribe medicine to help control blood pressure if lifestyle changes are not enough.  It's important to know your blood pressure numbers. Blood pressure measurements are given as 2 numbers. Systolic blood pressure is the upper number. This is the pressure when the heart contracts. Diastolic blood pressure is the lower number. This is the pressure when the heart relaxes between beats.  Blood pressure is categorized as normal, elevated, or stage 1 or stage 2 high blood pressure:  Normal blood pressure is systolic of less than 120 and diastolic of less than 80 (120/80)  Elevated blood pressure is systolic of 120 to 129 and diastolic less than 80  Stage 1 high blood pressure is systolic is 130 to 139 or diastolic between 80 to 89  Stage 2 high blood pressure is when systolic is 140 or higher or the diastolic is 90 or higher  High blood pressure is diagnosed when multiple, separate readings show blood pressure above 130/80 mmHg. Talk with your healthcare provider if you have questions or concerns about your blood pressure readings.  Measuring blood pressure  An example of a blood pressure measurement is 120/70 (120 over 70). The top number is the pressure of blood against the artery walls during a heartbeat (systolic). The bottom number is the pressure of blood against artery walls between  heartbeats (diastolic). Talk with your healthcare provider to find out what your blood pressure goals should be.   Controlling blood pressure  If your blood pressure is too high, work with your doctor on a plan for lowering it. Below are steps you can take that will help lower your blood pressure.  Choose heart-healthy foods. Eating healthier meals helps you control your blood pressure. Ask your doctor about the DASH eating plan. This plan helps reduce blood pressure by limiting the amount of sodium (salt) you have in your diet. DASH also encourages eating plenty of fruits and vegetables, low-fat or non-fat dairy, whole-grains, and foods high in fiber, and low in fat. This also provides an enhanced amount of potassium which can also help lower blood pressure.  Reduce sodium. Reducing sodium in your diet reduces fluid retention. Fluid retention caused by too much salt increases blood volume and blood pressure. The American Heart Association’s (AHA) \"ideal\" sodium intake recommendation is 1,500 milligrams per day.  However, since American's eat so much salt, the AHA says a positive change can occur by cutting back to even 2,400 milligrams of sodium a day.  Maintain a healthy weight. Being overweight makes you more likely to have high blood pressure. Losing excess weight helps lower blood pressure.  Exercise regularly. Daily exercise helps your heart and blood vessels work better and stay healthier. It can help lower your blood pressure.  Stop smoking. Smoking increases blood pressure and damages blood vessels.  Limit alcohol. Drinking too much alcohol can raise blood pressure. Men should have no more than 2 drinks a day. Women should have no more than 1. (A drink is equal to 1 beer, or a small glass of wine, or a shot of liquor.)  Control stress. Stress makes your heart work harder and beat faster. Controlling stress helps you control your blood pressure.  Facts about high blood pressure  Feeling OK does not mean that  blood pressure is under control. Likewise, feeling bad doesn’t mean it’s out of control. The only way to know for sure is to check your pressure regularly.  Medicine is only one part of controlling high blood pressure. You also need to manage your weight, get regular exercise, and adjust your eating habits.  High blood pressure is usually a lifelong problem. But it can be controlled with healthy lifestyle changes and medicine.  Hypertension is not the same as stress. Although stress may be a factor in high blood pressure, it’s only one part of the story.  Blood pressure medicines need to be taken every day. Stopping suddenly may cause a dangerous increase in pressure.  Uncontrolled High Blood Pressure  Your blood pressure was unusually high today. This can occur if you’ve missed doses of your blood pressure medicine. Or it can happen if you are taking other medicines. These include some asthma inhalers, decongestants, diet pills, and street drugs like cocaine and amphetamine.  Other causes include:  Weight gain  More salt in your diet  Smoking  Caffeine  Your blood pressure can also rise if you are emotionally upset or in intense pain. It may go back to normal after a period of rest.  Blood pressure measurements are given as 2 numbers. Systolic blood pressure is the upper number. This is the pressure when the heart contracts. Diastolic blood pressure is the lower number. This is the pressure when the heart relaxes between beats. You will see your blood pressure readings written together. For example, a person with a systolic pressure of 118 and a diastolic pressure of 78 will have 118/78 written in the medical record. To be high blood pressure, the numbers must be higher when tested over a period of time.  Blood pressure is categorized as normal, elevated, or stage 1 or stage 2 high blood pressure:  Normal blood pressure is systolic of less than 120 and diastolic of less than 80 (120/80)  Elevated blood pressure is  systolic of 120 to 129 and diastolic less than 80  Stage 1 high blood pressure is systolic is 130 to 139 or diastolic between 80 to 89  Stage 2 high blood pressure is when systolic is 140 or higher or the diastolic is 90 or higher  Uncontrolled high blood pressure can cause serious health problems. It raises your risk for heart attack, stroke, and heart failure. In general, if you have high blood pressure, keeping your blood pressure below 130/80 mmHg may help prevent these problems. Your healthcare provider may prescribe medicine to help control blood pressure if lifestyle changes are not enough.  Home care  It’s important to take steps to lower your blood pressure. If you are taking blood pressure medicine, the guidelines below may help you need less or no medicines in the future.  Start a weight-loss program if you are overweight.  Cut back on the amount of salt in your diet:  Avoid high-salt foods like olives, pickles, smoked meats, and salted potato chips.  Don’t add salt to your food at the table.  Use only small amounts of salt when cooking.  Start an exercise program. Talk with your healthcare provider about what exercise program is best for you. It doesn’t have to be difficult. Even brisk walking for 20 minutes 3 times a week is a good form of exercise.  Avoid medicines that stimulates the heart. This includes many over-the-counter cold and sinus decongestant pills and sprays, as well as diet pills. Check the warnings about high blood pressure on the label. Before purchasing any over-the-counter medicines or supplements, always ask the pharmacist about the product's potential interaction with your high blood pressure and your medicines.  Stimulants such as amphetamine or cocaine could be lethal for someone with high blood pressure. Never take these.  Limit how much caffeine you drink. Or switch to noncaffeinated beverages.  Stop smoking. If you are a long-time smoker, this can be hard. Enroll in a  stop-smoking program to make it more likely that you will succeed. Talk with your provider about ways to quit.  Learn how to handle stress better. This is an important part of any program to lower blood pressure. Learn ways to relax. These include meditation, yoga, and biofeedback.  If medicines were prescribed, take them exactly as directed. Missing doses may cause your blood pressure to get out of control.  If you miss a dose or doses of your medicines, check with your healthcare provider or pharmacist about what to do.  Consider buying an automatic blood pressure machine. Your provider may recommend a certain type. You can get one of these at most pharmacies. Measure your blood pressure twice a day, in the morning, and in the late afternoon. Keep a written record of your home blood pressure readings and take the record to your medical appointments.  Here are some additional guidelines on home blood pressure monitoring from the American Heart Association.  Don't smoke or drink coffee for 30 minutes  Go to the bathroom before the test.  Relax for 5 minutes before taking the measurement.  Sit correctly. Be sure your back is supported. Don't sit on a couch or soft chair. Uncross your feet and place them flat on the floor. Place your arm on a solid, flat surface like a table with the upper arm at heart level. Make certain the middle of the cuff is directly above the bend of the elbow. Check the monitor's instruction manual for an illustration.  Take multiple readings. When you measure, take 2 or 3 readings one minute apart and record all of the results.  Take your blood pressure at the same time every day, or as your healthcare provider recommends.  Record the date, time, and blood pressure reading.  Take the record with you to your next appointment. If your blood pressure monitor has a built-in memory, simply take the monitor with you to your next appointment.  Call your provider if you have several high readings.  Don't be frightened by a single high reading, but if you get several high readings, check in with your healthcare provider.  Note: When blood pressure reaches a systolic (top number) of 180 or higher or a diastolic (bottom number) of 110 or higher, emergency medical treatment is required. Call your healthcare provider immediately.  Follow-up care  Regular visits to your own healthcare provider for blood pressure and medicine checks are an important part of your care. Make a follow-up appointment as directed. Bring the record of your home blood pressure readings to the appointment.  When to seek medical advice  Call your healthcare provider right away if any of these occur:  Blood pressure reaches a systolic (top number) of 180 or higher or diastolic (bottom number) of 110 or higher, emergency medical treatment is required.  Chest, arm, shoulder, neck, or upper back pain  Shortness of breath  Severe headache  Throbbing or rushing sound in the ears  Nosebleed  Extreme drowsiness, confusion, or fainting  Dizziness or dizziness with spinning sensation (vertigo)  Weakness in an arm or leg or on one side of the face  Trouble speaking or seeing   Controlling High Blood Pressure  High blood pressure (hypertension) is often called the silent killer. This is because many people who have it, don’t know it. High blood pressure can raise your risk of heart attack, stroke, heart disease, and heart failure. Controlling your blood pressure can decrease your risk of these problems. Know your blood pressure and remember to check it regularly. Doing so can save your life.  Blood pressure measurements are given as 2 numbers. Systolic blood pressure is the upper number. This is the pressure when the heart contracts. Diastolic blood pressure is the lower number. This is the pressure when the heart relaxes between beats.  Blood pressure is categorized as normal, elevated, or stage 1 or stage 2 high blood pressure:  Normal blood pressure  is systolic of less than 120 and diastolic of less than 80 (120/80)  Elevated blood pressure is systolic of 120 to 129 and diastolic less than 80  Stage 1 high blood pressure is systolic is 130 to 139 or diastolic between 80 to 89  Stage 2 high blood pressure is when systolic is 140 or higher or the diastolic is 90 or higher  Here are some things you can do to help control your blood pressure.  Choose heart-healthy foods  Select low-salt, low-fat foods. Limit sodium intake to 2,400 mg per day or the amount suggested by your healthcare provider.  Limit canned, dried, cured, packaged, and fast foods. These can contain a lot of salt.  Eat 8 to 10 servings of fruits and vegetables every day.  Choose lean meats, fish, or chicken.  Eat whole-grain pasta, brown rice, and beans.  Eat 2 to 3 servings of low-fat or fat-free dairy products.  Ask your doctor about the DASH eating plan. This plan helps reduce blood pressure.  When you go to a restaurant, ask that your meal be prepared with no added salt.    Maintain a healthy weight  Ask your healthcare provider how many calories to eat a day. Then stick to that number.  Ask your healthcare provider what weight range is healthiest for you. If you are overweight, a weight loss of only 3% to 5% of your body weight can help lower blood pressure. Generally, a good weight loss goal is to lose 10% of your body weight in a year.  Limit snacks and sweets.  Get regular exercise.    Get up and get active  Choose activities you enjoy. Find ones you can do with friends or family. This includes bicycling, dancing, walking, and jogging.  Park farther away from building entrances.  Use stairs instead of the elevator.  When you can, walk or bike instead of driving.  [place] leaves, garden, or do household repairs.  Be active at a moderate to vigorous level of physical activity for at least 40 minutes for a minimum of 3 to 4 days a week.     Manage stress  Make time to relax and enjoy life. Find  time to laugh.  Communicate your concerns with your loved ones and your healthcare provider.  Visit with family and friends, and keep up with hobbies.    Limit alcohol and quit smoking  Men should have no more than 2 drinks per day.  Women should have no more than 1 drink per day.  Talk with your healthcare provider about quitting smoking. Smoking significantly increases your risk for heart disease and stroke. Ask your healthcare provider about community smoking cessation programs and other options.    Medicines  If lifestyle changes aren’t enough, your healthcare provider may prescribe high blood pressure medicine. Take all medicines as prescribed. If you have any questions about your medicines, ask your healthcare provider before stopping or changing them.  Low-Salt Choices  Eating salt (sodium) can make your body retain too much water. Excess water makes your heart work harder. Canned, packaged, and frozen foods are easy to prepare. But they are often high in sodium. Here are some ideas for low-salt foods you can easily make yourself.  For breakfast  Fruit or 100% fruit juice  Whole-wheat bread or an English muffin. Look for sodium content on Nutrition Facts labels.  Low-fat milk or yogurt  Unsalted eggs  Shredded wheat  Corn tortillas  Unsalted steamed rice  Regular (not instant) hot cereal, made without salt  Stay away from:  Sausage, jain, and ham  Flour tortillas  Packaged muffins, pancakes, and biscuits  Instant hot cereals  Cottage cheese    For lunch and dinner  Fresh fish, chicken, turkey, or meat--baked, broiled, or roasted without salt  Dry beans, cooked without salt  Tofu, stir-fried without salt  Unsalted fresh fruit and vegetables, or frozen or canned fruit and vegetables with no added salt  Stay away from:  Lunch or deli meat that is cured or smoked  Cheese  Tomato juice and ketchup  Canned vegetables, soups, and fish not labeled as no-salt-added or reduced sodium  Packaged gravies and  sauces  Olives, pickles, and relish  Bottled salad dressings    For snacks and desserts  Yogurt  Unsalted, air-popped popcorn  Unsalted nuts or seeds  Stay away from:  Pies and cakes  Packaged dessert mixes  Pizza  Canned and packaged puddings  Pretzels, chips, crackers, and nuts--unless the label says unsalted    Step-by-Step  Checking Your Blood Pressure

## 2024-04-15 RX ORDER — VALACYCLOVIR HYDROCHLORIDE 500 MG/1
500 TABLET, FILM COATED ORAL DAILY
Qty: 90 TABLET | Refills: 3 | Status: CANCELLED | OUTPATIENT
Start: 2024-04-15

## 2024-04-15 RX ORDER — VALACYCLOVIR HYDROCHLORIDE 500 MG/1
500 TABLET, FILM COATED ORAL 2 TIMES DAILY
Qty: 6 TABLET | Refills: 0 | Status: SHIPPED | OUTPATIENT
Start: 2024-04-15

## 2024-04-15 NOTE — TELEPHONE ENCOUNTER
See below message: Last annual 1/4/23  Pt sent MyChart for refill of Valtrex 500mg daily #90tabs with 3 refills     Has annual sched 10/8/24    Per [facility] on call ok to treat for outbreak with Valtrex 500mg twice daily for 3 days, Rx sent to pharm    To DONNA for recs on Suppressive therapy

## 2024-04-15 NOTE — TELEPHONE ENCOUNTER
Requested Prescriptions     Pending Prescriptions Disp Refills    valACYclovir (VALTREX) 500 MG Oral Tab 90 tablet 3     Sig: Take 1 tablet (500 mg total) by mouth daily.       Last annual 1/4/23  Last filled 1/4/23  Pap UTD  Mammo overdue- last done 5/7/21    Next annual DUE. Mychart sent to schedule annual and mammo.    Dx genital HSV-2, pt with current outbreak.   To [facility] on call- okay for refill?

## 2024-04-17 RX ORDER — VALACYCLOVIR HYDROCHLORIDE 1 G/1
1000 TABLET, FILM COATED ORAL DAILY
Qty: 90 TABLET | Refills: 1 | Status: SHIPPED | OUTPATIENT
Start: 2024-04-17

## 2024-05-03 ENCOUNTER — HOSPITAL ENCOUNTER (OUTPATIENT)
Dept: MAMMOGRAPHY | Age: 49
Discharge: HOME | End: 2024-05-03
Attending: INTERNAL MEDICINE
Payer: COMMERCIAL

## 2024-05-03 DIAGNOSIS — Z12.31 ENCOUNTER FOR SCREENING MAMMOGRAM FOR BREAST CANCER: ICD-10-CM

## 2024-05-03 PROCEDURE — 77063 BREAST TOMOSYNTHESIS BI: CPT | Performed by: INTERNAL MEDICINE

## 2024-05-03 PROCEDURE — 77067 SCR MAMMO BI INCL CAD: CPT | Performed by: INTERNAL MEDICINE

## 2024-05-06 DIAGNOSIS — Z12.31 ENCOUNTER FOR SCREENING MAMMOGRAM FOR BREAST CANCER: Primary | ICD-10-CM

## 2024-08-21 ENCOUNTER — PATIENT MESSAGE (OUTPATIENT)
Facility: LOCATION | Age: 49
End: 2024-08-21

## 2024-08-21 ENCOUNTER — TELEMEDICINE (OUTPATIENT)
Facility: LOCATION | Age: 49
End: 2024-08-21

## 2024-08-21 VITALS — SYSTOLIC BLOOD PRESSURE: 170 MMHG | DIASTOLIC BLOOD PRESSURE: 70 MMHG

## 2024-08-21 DIAGNOSIS — I10 PRIMARY HYPERTENSION: ICD-10-CM

## 2024-08-21 DIAGNOSIS — H00.014 HORDEOLUM EXTERNUM OF LEFT UPPER EYELID: Primary | ICD-10-CM

## 2024-08-21 PROCEDURE — 99214 OFFICE O/P EST MOD 30 MIN: CPT | Performed by: INTERNAL MEDICINE

## 2024-08-21 PROCEDURE — 3077F SYST BP >= 140 MM HG: CPT | Performed by: INTERNAL MEDICINE

## 2024-08-21 PROCEDURE — 3078F DIAST BP <80 MM HG: CPT | Performed by: INTERNAL MEDICINE

## 2024-08-21 RX ORDER — ERYTHROMYCIN 5 MG/G
1 OINTMENT OPHTHALMIC NIGHTLY
Qty: 3.5 G | Refills: 1 | Status: SHIPPED | OUTPATIENT
Start: 2024-08-21

## 2024-08-21 RX ORDER — TELMISARTAN 80 MG/1
80 TABLET ORAL NIGHTLY
Qty: 90 TABLET | Refills: 9 | Status: SHIPPED | OUTPATIENT
Start: 2024-08-21

## 2024-08-21 NOTE — TELEPHONE ENCOUNTER
From: Rocio Escobar  To: Garry Das  Sent: 8/21/2024 9:36 AM CDT  Subject: Swollen eye lid    Good Morning,    yesterday morning I woke up with a small red bump on my eyelid. This morning, I woke up with it very swollen, irritating and itchy. Could this be a stye? Should I schedule a video visit?    Thank you   Carina

## 2024-08-21 NOTE — PROGRESS NOTES
INTERNAL MEDICINE VIDEO VISIT NOTE     Patient ID: Rocio Escobar is a 49 year old female.  Today's Date: 08/21/24  HPI  1.  She has hypertension, blood pressure is typically 170s over 80s, she has been compliant on telmisartan and amlodipine, no side effects.  2.  She has a lesion on her left eyelid, started 2 days ago, it has been worsening, no discharge, there is no eye pain but she does have a lesion consistent with a stye.    Vitals:    08/21/24 1304   BP: (!) 170/70     body mass index is unknown because there is no height or weight on file.  BP Readings from Last 3 Encounters:   08/21/24 (!) 170/70   02/08/24 (!) 169/100   08/24/23 (!) 187/113     The 10-year ASCVD risk score (Jayro BARKLEY, et al., 2019) is: 9.3%    Values used to calculate the score:      Age: 49 years      Sex: Female      Is Non- : No      Diabetic: No      Tobacco smoker: Yes      Systolic Blood Pressure: 170 mmHg      Is BP treated: Yes      HDL Cholesterol: 57 mg/dL      Total Cholesterol: 248 mg/dL  Medications reviewed:  Current Outpatient Medications   Medication Sig Dispense Refill    erythromycin 5 MG/GM Ophthalmic Ointment Place 1 Application into the left eye nightly. 3.5 g 1    telmisartan 80 MG Oral Tab Take 1 tablet (80 mg total) by mouth nightly. FOR BLOOD PRESSURE. 90 tablet 9    valACYclovir (VALTREX) 1 G Oral Tab Take 1 tablet (1,000 mg total) by mouth daily. For daily suppression 90 tablet 1    valACYclovir (VALTREX) 500 MG Oral Tab Take 1 tablet (500 mg total) by mouth 2 (two) times daily. 6 tablet 0    cyclobenzaprine 5 MG Oral Tab Take 1-2 tablets (5-10 mg total) by mouth 3 (three) times daily as needed for Muscle spasms. May cause sedation; no driving. 90 tablet 1    amLODIPine 5 MG Oral Tab Take 1 tablet (5 mg total) by mouth daily. START IF YOUR HOME BLOOD PRESSURE ARE GREATER THAN 140/90 CONSISTENTLY. 90 tablet 9    rosuvastatin 10 MG Oral Tab Take 1 tablet (10 mg total)  by mouth nightly. FOR CHOLESTEROL. 90 tablet 9    sertraline 100 MG Oral Tab Take 2 tablets (200 mg total) by mouth daily. FOR ANXIETY. 180 tablet 9    busPIRone 5 MG Oral Tab Take 1 tablet (5 mg total) by mouth every 8 (eight) hours as needed (anxiety). 90 tablet 0    LORazepam 0.5 MG Oral Tab Take 1 tablet (0.5 mg total) by mouth every 12 (twelve) hours as needed for Anxiety. 30 tablet 1    nicotine 14 MG/24HR Transdermal Patch 24 Hr Place 1 patch onto the skin daily. 30 each 0    fluticasone-salmeterol 250-50 MCG/ACT Inhalation Aerosol Powder, Breath Activated Inhale 1 puff into the lungs every 12 (twelve) hours. 3 each 9         Assessment & Plan    1. Hordeolum externum of left upper eyelid (Primary)  -     Erythromycin; Place 1 Application into the left eye nightly.  Dispense: 3.5 g; Refill: 1  2. Primary hypertension  -     Telmisartan; Take 1 tablet (80 mg total) by mouth nightly. FOR BLOOD PRESSURE.  Dispense: 90 tablet; Refill: 9  Plan  Warm compress and topical erythromycin for the stye.  For the blood pressure double telmisartan 80, plan to see her back in 1 month for blood pressure/annual.    Follow Up: Return for ANNUAL EXAM, BLOOD PRESSURE; TRACK AT HOME, BRING RESULTS, WE WILL GET LABS AFTER YOUR VISIT..         Objective: Results:   Physical Exam  Nursing note reviewed.   Constitutional:       General: She is not in acute distress.     Appearance: Normal appearance.   HENT:      Head: Normocephalic and atraumatic.      Right Ear: External ear normal.      Left Ear: External ear normal.      Nose: Nose normal.   Eyes:      General:         Left eye: Hordeolum present.     Conjunctiva/sclera: Conjunctivae normal.   Pulmonary:      Effort: Pulmonary effort is normal. No respiratory distress.   Musculoskeletal:      Cervical back: Normal range of motion and neck supple.   Skin:     Coloration: Skin is not jaundiced.   Neurological:      General: No focal deficit present.      Mental Status: She is alert  and oriented to person, place, and time. Mental status is at baseline.   Psychiatric:         Mood and Affect: Mood normal.         Thought Content: Thought content normal.        Reviewed:  Patient Active Problem List    Diagnosis    Primary hypertension    COPD with asthma (HCC)    HSV infection    Elevated blood pressure reading in office without diagnosis of hypertension    Request for sterilization    NELLA (generalized anxiety disorder)    Left foot pain    Acute left ankle pain    Menorrhagia with regular cycle    Dyslipidemia    Other fatigue    Easy bruising    Skin tag    Seborrheic keratosis    Anxiety    Panic attacks    Insomnia    Nicotine dependence    Chronic mucoid otitis media    Dysuria      No Known Allergies     Social History     Socioeconomic History    Marital status: Single   Tobacco Use    Smoking status: Every Day     Current packs/day: 0.50     Average packs/day: 0.5 packs/day for 15.0 years (7.5 ttl pk-yrs)     Types: Cigarettes     Passive exposure: Current    Smokeless tobacco: Current    Tobacco comments:     not motivated to quit at this time   Vaping Use    Vaping status: Never Used   Substance and Sexual Activity    Alcohol use: Yes     Alcohol/week: 1.0 - 2.0 standard drink of alcohol     Types: 1 - 2 Glasses of wine per week     Comment: weekly    Drug use: No    Sexual activity: Yes     Partners: Male     Comment: none   Other Topics Concern    Caffeine Concern Yes     Comment: coffee 3 cups daily      Review of Systems  All other systems negative unless otherwise stated in ROS or HPI above.       Garry Das MD  Internal Medicine       Call office with any questions or seek emergency care if necessary.   Patient understands and agrees to follow directions and advice.    Telehealth Verbal Consent   I conducted a telehealth visit with Rocio Escobar today, 08/21/24, which was completed using two-way, real-time interactive audio and video communication. This has been done  in good nate to provide continuity of care in the best interest of the provider-patient relationship, due to the COVID -19 public health crisis/national emergency where restrictions of face-to-face office visits are ongoing. Every conscious effort was taken to allow for sufficient and adequate time to complete the visit.The patient was made aware of the limitations of the telehealth visit, including treatment limitations as no physical exam could be performed.  The patient was advised to call 911 or to go to the ER in case there was an emergency.  The patient was also advised of the potential privacy & security concerns related to the telehealth platform. The patient was made aware of where to find [facility]'s notice of privacy practices, telehealth consent form and other related consent forms and documents.  which are located on the [facility] website. The patient verbally agreed to telehealth consent form, related consents and the risks discussed.  Lastly, the patient confirmed that they were in [place]. Included in this visit, time may have been spent reviewing labs, medications, radiology tests and decision making. Appropriate medical decision-making and tests are ordered as detailed in the plan of care above.  Coding/billing information is submitted for this visit based on complexity of care and/or time spent for the visit.  ----------------------------------------- PATIENT INSTRUCTIONS-----------------------------------------     There are no Patient Instructions on file for this visit.

## 2024-09-20 ENCOUNTER — APPOINTMENT (OUTPATIENT)
Dept: CT IMAGING | Facility: HOSPITAL | Age: 49
End: 2024-09-20
Attending: EMERGENCY MEDICINE
Payer: COMMERCIAL

## 2024-09-20 ENCOUNTER — HOSPITAL ENCOUNTER (EMERGENCY)
Facility: HOSPITAL | Age: 49
Discharge: HOME | End: 2024-09-20
Attending: EMERGENCY MEDICINE
Payer: COMMERCIAL

## 2024-09-20 VITALS
RESPIRATION RATE: 18 BRPM | BODY MASS INDEX: 19.7 KG/M2 | TEMPERATURE: 99 F | OXYGEN SATURATION: 99 % | HEIGHT: 68 IN | HEART RATE: 81 BPM | WEIGHT: 130 LBS | DIASTOLIC BLOOD PRESSURE: 77 MMHG | SYSTOLIC BLOOD PRESSURE: 125 MMHG

## 2024-09-20 DIAGNOSIS — N12 PYELONEPHRITIS: Primary | ICD-10-CM

## 2024-09-20 LAB
ALBUMIN SERPL-MCNC: 4.5 G/DL (ref 3.2–4.8)
ALBUMIN/GLOB SERPL: 1.6 {RATIO} (ref 1–2)
ALP LIVER SERPL-CCNC: 54 U/L
ALT SERPL-CCNC: 11 U/L
ANION GAP SERPL CALC-SCNC: 7 MMOL/L (ref 0–18)
AST SERPL-CCNC: 20 U/L (ref ?–34)
BASOPHILS # BLD AUTO: 0.05 X10(3) UL (ref 0–0.2)
BASOPHILS NFR BLD AUTO: 0.4 %
BILIRUB SERPL-MCNC: 0.4 MG/DL (ref 0.3–1.2)
BILIRUB UR QL: NEGATIVE
BUN BLD-MCNC: 16 MG/DL (ref 9–23)
BUN/CREAT SERPL: 11.3 (ref 10–20)
CALCIUM BLD-MCNC: 9.2 MG/DL (ref 8.7–10.4)
CHLORIDE SERPL-SCNC: 110 MMOL/L (ref 98–112)
CO2 SERPL-SCNC: 22 MMOL/L (ref 21–32)
CREAT BLD-MCNC: 1.41 MG/DL
DEPRECATED RDW RBC AUTO: 48.3 FL (ref 35.1–46.3)
EGFRCR SERPLBLD CKD-EPI 2021: 46 ML/MIN/1.73M2 (ref 60–?)
EOSINOPHIL # BLD AUTO: 0.19 X10(3) UL (ref 0–0.7)
EOSINOPHIL NFR BLD AUTO: 1.5 %
ERYTHROCYTE [DISTWIDTH] IN BLOOD BY AUTOMATED COUNT: 13.8 % (ref 11–15)
GLOBULIN PLAS-MCNC: 2.9 G/DL (ref 2–3.5)
GLUCOSE BLD-MCNC: 145 MG/DL (ref 70–99)
GLUCOSE UR-MCNC: NORMAL MG/DL
HCT VFR BLD AUTO: 41.1 %
HGB BLD-MCNC: 13.8 G/DL
HGB UR QL STRIP.AUTO: NEGATIVE
IMM GRANULOCYTES # BLD AUTO: 0.05 X10(3) UL (ref 0–1)
IMM GRANULOCYTES NFR BLD: 0.4 %
KETONES UR-MCNC: NEGATIVE MG/DL
LACTATE SERPL-SCNC: 0.6 MMOL/L (ref 0.5–2)
LEUKOCYTE ESTERASE UR QL STRIP.AUTO: NEGATIVE
LIPASE SERPL-CCNC: 59 U/L (ref 12–53)
LYMPHOCYTES # BLD AUTO: 2.48 X10(3) UL (ref 1–4)
LYMPHOCYTES NFR BLD AUTO: 19.5 %
MCH RBC QN AUTO: 31.7 PG (ref 26–34)
MCHC RBC AUTO-ENTMCNC: 33.6 G/DL (ref 31–37)
MCV RBC AUTO: 94.3 FL
MONOCYTES # BLD AUTO: 1.41 X10(3) UL (ref 0.1–1)
MONOCYTES NFR BLD AUTO: 11.1 %
NEUTROPHILS # BLD AUTO: 8.54 X10 (3) UL (ref 1.5–7.7)
NEUTROPHILS # BLD AUTO: 8.54 X10(3) UL (ref 1.5–7.7)
NEUTROPHILS NFR BLD AUTO: 67.1 %
NITRITE UR QL STRIP.AUTO: NEGATIVE
OSMOLALITY SERPL CALC.SUM OF ELEC: 292 MOSM/KG (ref 275–295)
PH UR: 6 [PH] (ref 5–8)
PLATELET # BLD AUTO: 228 10(3)UL (ref 150–450)
POTASSIUM SERPL-SCNC: 4.4 MMOL/L (ref 3.5–5.1)
PROT SERPL-MCNC: 7.4 G/DL (ref 5.7–8.2)
PROT UR-MCNC: 200 MG/DL
RBC # BLD AUTO: 4.36 X10(6)UL
RBC #/AREA URNS AUTO: >10 /HPF
SODIUM SERPL-SCNC: 139 MMOL/L (ref 136–145)
SP GR UR STRIP: 1.01 (ref 1–1.03)
UROBILINOGEN UR STRIP-ACNC: NORMAL
WBC # BLD AUTO: 12.7 X10(3) UL (ref 4–11)

## 2024-09-20 PROCEDURE — 87040 BLOOD CULTURE FOR BACTERIA: CPT | Performed by: EMERGENCY MEDICINE

## 2024-09-20 PROCEDURE — 81001 URINALYSIS AUTO W/SCOPE: CPT | Performed by: EMERGENCY MEDICINE

## 2024-09-20 PROCEDURE — 96365 THER/PROPH/DIAG IV INF INIT: CPT

## 2024-09-20 PROCEDURE — 80053 COMPREHEN METABOLIC PANEL: CPT | Performed by: EMERGENCY MEDICINE

## 2024-09-20 PROCEDURE — 99285 EMERGENCY DEPT VISIT HI MDM: CPT

## 2024-09-20 PROCEDURE — 96375 TX/PRO/DX INJ NEW DRUG ADDON: CPT

## 2024-09-20 PROCEDURE — 83690 ASSAY OF LIPASE: CPT | Performed by: EMERGENCY MEDICINE

## 2024-09-20 PROCEDURE — 74176 CT ABD & PELVIS W/O CONTRAST: CPT | Performed by: EMERGENCY MEDICINE

## 2024-09-20 PROCEDURE — 83605 ASSAY OF LACTIC ACID: CPT | Performed by: EMERGENCY MEDICINE

## 2024-09-20 PROCEDURE — 85025 COMPLETE CBC W/AUTO DIFF WBC: CPT | Performed by: EMERGENCY MEDICINE

## 2024-09-20 PROCEDURE — 36415 COLL VENOUS BLD VENIPUNCTURE: CPT

## 2024-09-20 PROCEDURE — 96361 HYDRATE IV INFUSION ADD-ON: CPT

## 2024-09-20 RX ORDER — ONDANSETRON 2 MG/ML
4 INJECTION INTRAMUSCULAR; INTRAVENOUS ONCE
Status: COMPLETED | OUTPATIENT
Start: 2024-09-20 | End: 2024-09-20

## 2024-09-20 RX ORDER — MORPHINE SULFATE 2 MG/ML
2 INJECTION, SOLUTION INTRAMUSCULAR; INTRAVENOUS
Status: DISCONTINUED | OUTPATIENT
Start: 2024-09-20 | End: 2024-09-20

## 2024-09-20 RX ORDER — ONDANSETRON 4 MG/1
4 TABLET, ORALLY DISINTEGRATING ORAL EVERY 4 HOURS PRN
Qty: 10 TABLET | Refills: 0 | Status: SHIPPED | OUTPATIENT
Start: 2024-09-20 | End: 2024-09-27

## 2024-09-20 RX ORDER — HYDROCODONE BITARTRATE AND ACETAMINOPHEN 5; 325 MG/1; MG/1
1 TABLET ORAL EVERY 6 HOURS PRN
Qty: 5 TABLET | Refills: 0 | Status: SHIPPED | OUTPATIENT
Start: 2024-09-20 | End: 2024-09-25

## 2024-09-20 RX ORDER — MORPHINE SULFATE 2 MG/ML
2 INJECTION, SOLUTION INTRAMUSCULAR; INTRAVENOUS ONCE
Status: COMPLETED | OUTPATIENT
Start: 2024-09-20 | End: 2024-09-20

## 2024-09-20 RX ORDER — CEFDINIR 300 MG/1
300 CAPSULE ORAL 2 TIMES DAILY
Qty: 20 CAPSULE | Refills: 0 | Status: SHIPPED | OUTPATIENT
Start: 2024-09-20 | End: 2024-09-30

## 2024-09-20 RX ORDER — KETOROLAC TROMETHAMINE 15 MG/ML
15 INJECTION, SOLUTION INTRAMUSCULAR; INTRAVENOUS ONCE
Status: COMPLETED | OUTPATIENT
Start: 2024-09-20 | End: 2024-09-20

## 2024-09-20 NOTE — ED PROVIDER NOTES
[facility]  Emergency Department Attending Note     Chief Complaint:   Chief Complaint   Patient presents with    Flank Pain     HISTORY OF PRESENT ILLNESS:   Rocio Escobar is a 49 year old female who presents to the ED with complaints of urinary frequency urgency and right flank pain since earlier this morning.  States that the urinary frequency and urgency has been for couple days but then she developed right-sided flank pain worse today.  Denies any vomiting but has felt nauseous.  Denies diarrhea.  Denies abdominal pain.  Denies any pleuritic or exertional component to the discomfort.  Denies any vaginal complaints     MEDICAL & SOCIAL HISTORY:   Past Medical History:    Anxiety state, unspecified    saw therapist     Bartholin cyst    excision of Batholin's cyst    Herpes    last outbreak - , valtrex    History of abnormal Pap smear    LEEP    History of pregnancy    EAB,     Vulvar hematoma    post op, evac and revision surgery      Past Surgical History:   Procedure Laterality Date    Hysteroscopy      Leep      Other surgical history      Excision of Bartholin's cyst    Other surgical history  2010    Evacuation and revision surgery for vulvar hematoma      Social History     Socioeconomic History    Marital status: Single   Tobacco Use    Smoking status: Every Day     Current packs/day: 0.50     Average packs/day: 0.5 packs/day for 15.0 years (7.5 ttl pk-yrs)     Types: Cigarettes     Passive exposure: Current    Smokeless tobacco: Current    Tobacco comments:     not motivated to quit at this time   Vaping Use    Vaping status: Never Used   Substance and Sexual Activity    Alcohol use: Yes     Alcohol/week: 1.0 - 2.0 standard drink of alcohol     Types: 1 - 2 Glasses of wine per week     Comment: weekly    Drug use: No    Sexual activity: Yes     Partners: Male     Comment: none   Other Topics Concern    Caffeine Concern Yes     Comment: coffee 3 cups daily    No  Known Allergies   Current Outpatient Medications   Medication Sig Dispense Refill    cefdinir 300 MG Oral Cap Take 1 capsule (300 mg total) by mouth 2 (two) times daily for 10 days. 20 capsule 0    erythromycin 5 MG/GM Ophthalmic Ointment Place 1 Application into the left eye nightly. 3.5 g 1    telmisartan 80 MG Oral Tab Take 1 tablet (80 mg total) by mouth nightly. FOR BLOOD PRESSURE. 90 tablet 9    valACYclovir (VALTREX) 1 G Oral Tab Take 1 tablet (1,000 mg total) by mouth daily. For daily suppression 90 tablet 1    valACYclovir (VALTREX) 500 MG Oral Tab Take 1 tablet (500 mg total) by mouth 2 (two) times daily. 6 tablet 0    cyclobenzaprine 5 MG Oral Tab Take 1-2 tablets (5-10 mg total) by mouth 3 (three) times daily as needed for Muscle spasms. May cause sedation; no driving. 90 tablet 1    amLODIPine 5 MG Oral Tab Take 1 tablet (5 mg total) by mouth daily. START IF YOUR HOME BLOOD PRESSURE ARE GREATER THAN 140/90 CONSISTENTLY. 90 tablet 9    rosuvastatin 10 MG Oral Tab Take 1 tablet (10 mg total) by mouth nightly. FOR CHOLESTEROL. 90 tablet 9    sertraline 100 MG Oral Tab Take 2 tablets (200 mg total) by mouth daily. FOR ANXIETY. 180 tablet 9    busPIRone 5 MG Oral Tab Take 1 tablet (5 mg total) by mouth every 8 (eight) hours as needed (anxiety). 90 tablet 0    LORazepam 0.5 MG Oral Tab Take 1 tablet (0.5 mg total) by mouth every 12 (twelve) hours as needed for Anxiety. 30 tablet 1    nicotine 14 MG/24HR Transdermal Patch 24 Hr Place 1 patch onto the skin daily. 30 each 0    fluticasone-salmeterol 250-50 MCG/ACT Inhalation Aerosol Powder, Breath Activated Inhale 1 puff into the lungs every 12 (twelve) hours. 3 each 9          REVIEW OF SYSTEMS   A 10 point review of systems was completed and is negative except as listed in history of present illness      PHYSICAL EXAM   Vitals: /72   Pulse 88   Temp 98 °F (36.7 °C) (Temporal)   Resp 18   Ht 172.7 cm (5' 8\")   Wt 59 kg   SpO2 99%   BMI 19.77 kg/m²    /72   Pulse 88   Temp 98 °F (36.7 °C) (Temporal)   Resp 18   Ht 172.7 cm (5' 8\")   Wt 59 kg   SpO2 99%   BMI 19.77 kg/m²     General: A&Ox3, NAD  Constitutional: Well developed, well nourished, nontoxic  Head: atraumatic, normocephalic   Eyes: conjuctiva clear, no icterus, PERRL, EOMI, vision grossly normal  Ears: normal external appearance, no drainage  Nose:  Atraumatic, no swelling, no drainage, nares patent  Throat:  Moist pink mucous membranes, airway is patent  Neck:  Soft supple, no masses, no tracheal deviation, no stridor  Chest:  No bruising or abrasions, no tenderness, no deformity  Cardiac:  Regular rate and rhythm, no murmurs rubs or gallops.  Lung:  No distress, no retractions. Clear to auscultation bilaterally, no w/r/r  Abdomen:  Soft, nontender, nondistended, normal BS no right upper quadrant tenderness  Back: No stepoff/deformity slightly tender to palpation to the right flank  Extremities: FROM all ext, no deformities, intact equal peripheral pulses, no cyanosis or edema  Neuro: No facial droop, no slurred speech, moving all extremities freely, SILT to the bilateral upper and lower extremities  Psych: A&Ox3, normal affect, cooperative, calm  Skin: No rash, no petechiae/purpura, warm, dry      RESULTS  LABS:   Results for orders placed or performed during the hospital encounter of 09/20/24   CBC With Differential With Platelet   Result Value Ref Range    WBC 12.7 (H) 4.0 - 11.0 x10(3) uL    RBC 4.36 3.80 - 5.30 x10(6)uL    HGB 13.8 12.0 - 16.0 g/dL    HCT 41.1 35.0 - 48.0 %    MCV 94.3 80.0 - 100.0 fL    MCH 31.7 26.0 - 34.0 pg    MCHC 33.6 31.0 - 37.0 g/dL    RDW-SD 48.3 (H) 35.1 - 46.3 fL    RDW 13.8 11.0 - 15.0 %    .0 150.0 - 450.0 10(3)uL    Neutrophil Absolute Prelim 8.54 (H) 1.50 - 7.70 x10 (3) uL    Neutrophil Absolute 8.54 (H) 1.50 - 7.70 x10(3) uL    Lymphocyte Absolute 2.48 1.00 - 4.00 x10(3) uL    Monocyte Absolute 1.41 (H) 0.10 - 1.00 x10(3) uL    Eosinophil  Absolute 0.19 0.00 - 0.70 x10(3) uL    Basophil Absolute 0.05 0.00 - 0.20 x10(3) uL    Immature Granulocyte Absolute 0.05 0.00 - 1.00 x10(3) uL    Neutrophil % 67.1 %    Lymphocyte % 19.5 %    Monocyte % 11.1 %    Eosinophil % 1.5 %    Basophil % 0.4 %    Immature Granulocyte % 0.4 %   Comp Metabolic Panel (14)   Result Value Ref Range    Glucose 145 (H) 70 - 99 mg/dL    Sodium 139 136 - 145 mmol/L    Potassium 4.4 3.5 - 5.1 mmol/L    Chloride 110 98 - 112 mmol/L    CO2 22.0 21.0 - 32.0 mmol/L    Anion Gap 7 0 - 18 mmol/L    BUN 16 9 - 23 mg/dL    Creatinine 1.41 (H) 0.55 - 1.02 mg/dL    BUN/CREA Ratio 11.3 10.0 - 20.0    Calcium, Total 9.2 8.7 - 10.4 mg/dL    Calculated Osmolality 292 275 - 295 mOsm/kg    eGFR-Cr 46 (L) >=60 mL/min/1.73m2    ALT 11 10 - 49 U/L    AST 20 <34 U/L    Alkaline Phosphatase 54 39 - 100 U/L    Bilirubin, Total 0.4 0.3 - 1.2 mg/dL    Total Protein 7.4 5.7 - 8.2 g/dL    Albumin 4.5 3.2 - 4.8 g/dL    Globulin  2.9 2.0 - 3.5 g/dL    A/G Ratio 1.6 1.0 - 2.0   Urinalysis with Culture Reflex    Specimen: Urine, clean catch   Result Value Ref Range    Urine Color Light-Yellow Yellow    Clarity Urine Turbid (A) Clear    Spec Gravity 1.015 1.005 - 1.030    Glucose Urine Normal Normal mg/dL    Bilirubin Urine Negative Negative    Ketones Urine Negative Negative mg/dL    Blood Urine Negative Negative    pH Urine 6.0 5.0 - 8.0    Protein Urine 200 (A) Negative mg/dL    Urobilinogen Urine Normal Normal    Nitrite Urine Negative Negative    Leukocyte Esterase Urine Negative Negative    WBC Urine 6-10 (A) 0 - 5 /HPF    RBC Urine >10 (A) 0 - 2 /HPF    Bacteria Urine None Seen None Seen /HPF    Squamous Epi. Cells Few (A) None Seen /HPF    Renal Tubular Epithelial Cells None Seen None Seen /HPF    Transitional Cells None Seen None Seen /HPF    Yeast Urine None Seen None Seen /HPF   Lipase   Result Value Ref Range    Lipase 59 (H) 12 - 53 U/L   Lactic Acid, Plasma   Result Value Ref Range    Lactic Acid 0.6  0.5 - 2.0 mmol/L         IMAGING: CT ABDOMEN+PELVIS(CPT=74176)    Result Date: 9/20/2024  CONCLUSION:   Bladder wall thickening, greater than what is expected for under distention can be seen with cystitis. Correlation with urinalysis suggested.  Mild right perinephric stranding suggestive of ascending urinary tract infection.  No radiopaque renal calculus or hydronephrosis.     Dictated by (CST): Nitesh Klein MD on 9/20/2024 at 8:22 AM     Finalized by (CST): Nitesh Klein MD on 9/20/2024 at 8:24 AM           I personally reviewed the radiology study and my finding were bladder wall thickening and mild right perinephric stranding      Procedures:   Procedures       ED COURSE          Re-Evaluation: Improved      Disposition & Plan:   Clinical Impression/Final Diagnosis:   1. Pyelonephritis        Medical Decision Making: The patient arrived feeling quite ill with significant pain to the right flank.  After analgesia, IV fluids and IV antibiotic the patient reports she is feeling entirely better.  She states she would like to go home and I did discuss potential admission to the hospital with her.  Patient states she does not want to stay in the hospital at this time, as she feels much better now.  I have given her Rocephin and fluids in the emergency department and she is tolerating p.o. and nontoxic in appearance.  She is afebrile.  Will prescribe cefdinir for home and blood cultures have been drawn and the patient is to follow-up very closely with her primary doctor in the next day or 2 and return immediately for new or worsening symptoms or concern to the patient advised understanding and agreement this plan.    Medical Decision Making  Amount and/or Complexity of Data Reviewed  External Data Reviewed: notes.  Labs: ordered. Decision-making details documented in ED Course.  Radiology: ordered and independent interpretation performed. Decision-making details documented in ED Course.    Risk  OTC  drugs.  Prescription drug management.  Decision regarding hospitalization.    Critical Care  Total time providing critical care: 42 minutes        Disposition: Discharge  There are no disposition comments on file for this visit.     This note was generated in part using voice recognition dictation technology. The report was reviewed by this physician but still may have unintentional errors due to inherent limitations of voice recognition technology. All times are estimates.

## 2024-09-30 ENCOUNTER — OFFICE VISIT (OUTPATIENT)
Facility: LOCATION | Age: 49
End: 2024-09-30

## 2024-09-30 ENCOUNTER — LAB ENCOUNTER (OUTPATIENT)
Dept: LAB | Age: 49
End: 2024-09-30
Attending: INTERNAL MEDICINE
Payer: COMMERCIAL

## 2024-09-30 VITALS
SYSTOLIC BLOOD PRESSURE: 132 MMHG | DIASTOLIC BLOOD PRESSURE: 78 MMHG | BODY MASS INDEX: 22 KG/M2 | HEART RATE: 77 BPM | WEIGHT: 142 LBS | OXYGEN SATURATION: 98 %

## 2024-09-30 DIAGNOSIS — Z13.0 SCREENING FOR ENDOCRINE, NUTRITIONAL, METABOLIC AND IMMUNITY DISORDER: ICD-10-CM

## 2024-09-30 DIAGNOSIS — Z13.29 SCREENING FOR ENDOCRINE, NUTRITIONAL, METABOLIC AND IMMUNITY DISORDER: ICD-10-CM

## 2024-09-30 DIAGNOSIS — B00.9 HSV INFECTION: ICD-10-CM

## 2024-09-30 DIAGNOSIS — Z13.21 SCREENING FOR ENDOCRINE, NUTRITIONAL, METABOLIC AND IMMUNITY DISORDER: ICD-10-CM

## 2024-09-30 DIAGNOSIS — F41.1 GAD (GENERALIZED ANXIETY DISORDER): ICD-10-CM

## 2024-09-30 DIAGNOSIS — Z13.228 SCREENING FOR ENDOCRINE, NUTRITIONAL, METABOLIC AND IMMUNITY DISORDER: ICD-10-CM

## 2024-09-30 DIAGNOSIS — N17.9 AKI (ACUTE KIDNEY INJURY) (HCC): ICD-10-CM

## 2024-09-30 DIAGNOSIS — Z87.891 PERSONAL HISTORY OF NICOTINE DEPENDENCE: ICD-10-CM

## 2024-09-30 DIAGNOSIS — Z12.31 ENCOUNTER FOR SCREENING MAMMOGRAM FOR BREAST CANCER: ICD-10-CM

## 2024-09-30 DIAGNOSIS — I10 PRIMARY HYPERTENSION: ICD-10-CM

## 2024-09-30 DIAGNOSIS — Z12.11 COLON CANCER SCREENING: ICD-10-CM

## 2024-09-30 DIAGNOSIS — N12 PYELONEPHRITIS: ICD-10-CM

## 2024-09-30 DIAGNOSIS — E55.9 VITAMIN D DEFICIENCY: ICD-10-CM

## 2024-09-30 DIAGNOSIS — Z13.6 ENCOUNTER FOR SCREENING FOR CORONARY ARTERY DISEASE: ICD-10-CM

## 2024-09-30 DIAGNOSIS — Z00.00 ANNUAL PHYSICAL EXAM: Primary | ICD-10-CM

## 2024-09-30 DIAGNOSIS — E78.5 DYSLIPIDEMIA: ICD-10-CM

## 2024-09-30 DIAGNOSIS — J43.2 CENTRILOBULAR EMPHYSEMA (HCC): ICD-10-CM

## 2024-09-30 PROBLEM — F41.0 PANIC ATTACKS: Status: RESOLVED | Noted: 2019-02-19 | Resolved: 2024-09-30

## 2024-09-30 PROBLEM — N92.0 MENORRHAGIA WITH REGULAR CYCLE: Status: RESOLVED | Noted: 2019-06-27 | Resolved: 2024-09-30

## 2024-09-30 PROBLEM — M79.672 LEFT FOOT PAIN: Status: RESOLVED | Noted: 2019-06-27 | Resolved: 2024-09-30

## 2024-09-30 PROBLEM — G47.00 INSOMNIA: Status: RESOLVED | Noted: 2019-02-19 | Resolved: 2024-09-30

## 2024-09-30 PROBLEM — M25.572 ACUTE LEFT ANKLE PAIN: Status: RESOLVED | Noted: 2019-06-27 | Resolved: 2024-09-30

## 2024-09-30 PROBLEM — F41.9 ANXIETY: Status: RESOLVED | Noted: 2019-02-19 | Resolved: 2024-09-30

## 2024-09-30 PROBLEM — R53.83 OTHER FATIGUE: Status: RESOLVED | Noted: 2019-02-19 | Resolved: 2024-09-30

## 2024-09-30 PROBLEM — J44.89 COPD WITH ASTHMA (HCC): Status: RESOLVED | Noted: 2023-08-24 | Resolved: 2024-09-30

## 2024-09-30 PROBLEM — R23.3 EASY BRUISING: Status: RESOLVED | Noted: 2019-02-19 | Resolved: 2024-09-30

## 2024-09-30 PROBLEM — R03.0 ELEVATED BLOOD PRESSURE READING IN OFFICE WITHOUT DIAGNOSIS OF HYPERTENSION: Status: RESOLVED | Noted: 2023-01-05 | Resolved: 2024-09-30

## 2024-09-30 PROBLEM — Z30.2 REQUEST FOR STERILIZATION: Status: RESOLVED | Noted: 2020-12-01 | Resolved: 2024-09-30

## 2024-09-30 PROBLEM — L91.8 SKIN TAG: Status: RESOLVED | Noted: 2019-02-19 | Resolved: 2024-09-30

## 2024-09-30 PROBLEM — L82.1 SEBORRHEIC KERATOSIS: Status: RESOLVED | Noted: 2019-02-19 | Resolved: 2024-09-30

## 2024-09-30 LAB
ALBUMIN SERPL-MCNC: 4.5 G/DL (ref 3.2–4.8)
ALBUMIN/GLOB SERPL: 1.6 {RATIO} (ref 1–2)
ALP LIVER SERPL-CCNC: 56 U/L
ALT SERPL-CCNC: 14 U/L
ANION GAP SERPL CALC-SCNC: 10 MMOL/L (ref 0–18)
AST SERPL-CCNC: 20 U/L (ref ?–34)
BILIRUB SERPL-MCNC: 0.6 MG/DL (ref 0.3–1.2)
BUN BLD-MCNC: 13 MG/DL (ref 9–23)
BUN/CREAT SERPL: 19.1 (ref 10–20)
CALCIUM BLD-MCNC: 9.3 MG/DL (ref 8.7–10.4)
CHLORIDE SERPL-SCNC: 107 MMOL/L (ref 98–112)
CHOLEST SERPL-MCNC: 258 MG/DL (ref ?–200)
CO2 SERPL-SCNC: 23 MMOL/L (ref 21–32)
CREAT BLD-MCNC: 0.68 MG/DL
DEPRECATED RDW RBC AUTO: 43.8 FL (ref 35.1–46.3)
EGFRCR SERPLBLD CKD-EPI 2021: 108 ML/MIN/1.73M2 (ref 60–?)
ERYTHROCYTE [DISTWIDTH] IN BLOOD BY AUTOMATED COUNT: 13 % (ref 11–15)
EST. AVERAGE GLUCOSE BLD GHB EST-MCNC: 111 MG/DL (ref 68–126)
FASTING PATIENT LIPID ANSWER: NO
FASTING STATUS PATIENT QL REPORTED: NO
GLOBULIN PLAS-MCNC: 2.8 G/DL (ref 2–3.5)
GLUCOSE BLD-MCNC: 101 MG/DL (ref 70–99)
HBA1C MFR BLD: 5.5 % (ref ?–5.7)
HCT VFR BLD AUTO: 37.4 %
HDLC SERPL-MCNC: 49 MG/DL (ref 40–59)
HGB BLD-MCNC: 13.1 G/DL
LDLC SERPL CALC-MCNC: 188 MG/DL (ref ?–100)
MCH RBC QN AUTO: 32.1 PG (ref 26–34)
MCHC RBC AUTO-ENTMCNC: 35 G/DL (ref 31–37)
MCV RBC AUTO: 91.7 FL
NONHDLC SERPL-MCNC: 209 MG/DL (ref ?–130)
OSMOLALITY SERPL CALC.SUM OF ELEC: 290 MOSM/KG (ref 275–295)
PLATELET # BLD AUTO: 280 10(3)UL (ref 150–450)
POTASSIUM SERPL-SCNC: 4.3 MMOL/L (ref 3.5–5.1)
PROT SERPL-MCNC: 7.3 G/DL (ref 5.7–8.2)
RBC # BLD AUTO: 4.08 X10(6)UL
SODIUM SERPL-SCNC: 140 MMOL/L (ref 136–145)
TRIGL SERPL-MCNC: 117 MG/DL (ref 30–149)
TSI SER-ACNC: 0.71 MIU/ML (ref 0.55–4.78)
VIT B12 SERPL-MCNC: 267 PG/ML (ref 211–911)
VIT D+METAB SERPL-MCNC: 34.2 NG/ML (ref 30–100)
VLDLC SERPL CALC-MCNC: 24 MG/DL (ref 0–30)
WBC # BLD AUTO: 7.4 X10(3) UL (ref 4–11)

## 2024-09-30 PROCEDURE — 80053 COMPREHEN METABOLIC PANEL: CPT | Performed by: INTERNAL MEDICINE

## 2024-09-30 PROCEDURE — 85027 COMPLETE CBC AUTOMATED: CPT | Performed by: INTERNAL MEDICINE

## 2024-09-30 PROCEDURE — 82306 VITAMIN D 25 HYDROXY: CPT | Performed by: INTERNAL MEDICINE

## 2024-09-30 PROCEDURE — 82607 VITAMIN B-12: CPT

## 2024-09-30 PROCEDURE — 80061 LIPID PANEL: CPT | Performed by: INTERNAL MEDICINE

## 2024-09-30 PROCEDURE — 84443 ASSAY THYROID STIM HORMONE: CPT | Performed by: INTERNAL MEDICINE

## 2024-09-30 PROCEDURE — 83036 HEMOGLOBIN GLYCOSYLATED A1C: CPT | Performed by: INTERNAL MEDICINE

## 2024-09-30 PROCEDURE — 36415 COLL VENOUS BLD VENIPUNCTURE: CPT | Performed by: INTERNAL MEDICINE

## 2024-09-30 RX ORDER — TIOTROPIUM BROMIDE 18 UG/1
18 CAPSULE ORAL; RESPIRATORY (INHALATION) DAILY
Qty: 90 CAPSULE | Refills: 3 | Status: SHIPPED | OUTPATIENT
Start: 2024-09-30 | End: 2025-09-25

## 2024-09-30 RX ORDER — SERTRALINE HYDROCHLORIDE 100 MG/1
200 TABLET, FILM COATED ORAL DAILY
Qty: 180 TABLET | Refills: 9 | Status: SHIPPED | OUTPATIENT
Start: 2024-09-30

## 2024-09-30 RX ORDER — VALACYCLOVIR HYDROCHLORIDE 500 MG/1
500 TABLET, FILM COATED ORAL 2 TIMES DAILY
Qty: 180 TABLET | Refills: 3 | Status: SHIPPED | OUTPATIENT
Start: 2024-09-30

## 2024-09-30 RX ORDER — ROSUVASTATIN CALCIUM 10 MG/1
10 TABLET, COATED ORAL NIGHTLY
Qty: 90 TABLET | Refills: 9 | Status: SHIPPED | OUTPATIENT
Start: 2024-09-30

## 2024-09-30 RX ORDER — TELMISARTAN 80 MG/1
80 TABLET ORAL NIGHTLY
Qty: 90 TABLET | Refills: 9 | Status: SHIPPED | OUTPATIENT
Start: 2024-09-30

## 2024-09-30 RX ORDER — AMLODIPINE BESYLATE 5 MG/1
5 TABLET ORAL DAILY
Qty: 90 TABLET | Refills: 9 | Status: SHIPPED | OUTPATIENT
Start: 2024-09-30

## 2024-09-30 RX ORDER — VARENICLINE TARTRATE 1 MG/1
1 TABLET, FILM COATED ORAL 2 TIMES DAILY
Qty: 60 TABLET | Refills: 6 | Status: SHIPPED | OUTPATIENT
Start: 2024-09-30 | End: 2025-04-28

## 2024-09-30 NOTE — PROGRESS NOTES
INTERNAL MEDICINE ANNUAL EXAM NOTE     Patient ID: Rocio Escobar is a 49 year old female.  Chief Complaint: Physical      Rocio Escobar is a pleasant 49 year old female who presents for annual physical exam. Rocio Escobar is doing well today.  1.  We discussed smoking cessation, she has been doing well on sertraline, she would like to consider Chantix.  She smoke about a pack per day.  Does have some shortness of breath, there is some reduced breath sounds, we discussed Spiriva.  2.  She was seen in the ED about 1 week ago, notes reviewed and appreciated, CT scan consistent with cystitis and Jama, she was placed on cefdinir, she states her symptoms have resolved today.  No complaints.  HERB was noted.  No lactic acidosis.            Health Maintenance  - All care gaps addressed with patient.     Review of Systems  Review of Systems   Constitutional:  Negative for unexpected weight change.   Eyes:  Negative for visual disturbance.   Respiratory:  Negative for shortness of breath.    Cardiovascular:  Negative for chest pain, palpitations and leg swelling.   Neurological:  Negative for dizziness, syncope, weakness, light-headedness and headaches.   Psychiatric/Behavioral: Negative.         Physical Exam  Vitals:    09/30/24 0935   BP: 132/78   Pulse: 77   SpO2: 98%   Weight: 142 lb (64.4 kg)     Body mass index is 21.59 kg/m².  BP Readings from Last 3 Encounters:   09/30/24 132/78   09/20/24 125/77   08/21/24 (!) 170/70     Physical Exam  Vitals and nursing note reviewed.   Constitutional:       General: She is not in acute distress.     Appearance: Normal appearance.   HENT:      Head: Normocephalic.      Right Ear: External ear normal.      Left Ear: External ear normal.   Eyes:      Extraocular Movements: Extraocular movements intact.      Conjunctiva/sclera: Conjunctivae normal.   Cardiovascular:      Rate and Rhythm: Normal rate and regular rhythm.      Pulses: Normal pulses.       Heart sounds: Normal heart sounds.   Pulmonary:      Effort: Pulmonary effort is normal. No respiratory distress.      Breath sounds: Normal breath sounds. No wheezing.      Comments: Reduced air entry bilaterally- no acute findings  Abdominal:      General: Abdomen is flat. Bowel sounds are normal.      Tenderness: There is no abdominal tenderness. There is no right CVA tenderness or left CVA tenderness.   Musculoskeletal:         General: Normal range of motion.      Cervical back: Normal range of motion and neck supple.   Skin:     Coloration: Skin is not jaundiced.   Neurological:      General: No focal deficit present.      Mental Status: She is alert and oriented to person, place, and time. Mental status is at baseline.   Psychiatric:         Mood and Affect: Mood normal.         Behavior: Behavior normal.           Labs & Imaging  Pertinent labs and imaging reviewed.   Lab Results   Component Value Date     (H) 09/20/2024    BUN 16 09/20/2024    BUNCREA 11.3 09/20/2024    CREATSERUM 1.41 (H) 09/20/2024    ANIONGAP 7 09/20/2024    GFRNAA 95 02/19/2019    GFRAA 109 02/19/2019    CA 9.2 09/20/2024    OSMOCALC 292 09/20/2024    ALKPHO 54 09/20/2024    AST 20 09/20/2024    ALT 11 09/20/2024    BILT 0.4 09/20/2024    TP 7.4 09/20/2024    ALB 4.5 09/20/2024    GLOBULIN 2.9 09/20/2024     09/20/2024    K 4.4 09/20/2024     09/20/2024    CO2 22.0 09/20/2024     Lab Results   Component Value Date     08/24/2023    A1C 5.5 08/24/2023     Lab Results   Component Value Date    WBC 12.7 (H) 09/20/2024    RBC 4.36 09/20/2024    HGB 13.8 09/20/2024    HCT 41.1 09/20/2024    MCV 94.3 09/20/2024    MCH 31.7 09/20/2024    MCHC 33.6 09/20/2024    RDW 13.8 09/20/2024    .0 09/20/2024    MPV 9.6 02/09/2017     Lab Results   Component Value Date    CHOLEST 248 (H) 08/24/2023    TRIG 145 08/24/2023    HDL 57 08/24/2023     (H) 08/24/2023    VLDL 29 08/24/2023    NONHDLC 191 (H) 08/24/2023      The 10-year ASCVD risk score (Jayro BARKLEY, et al., 2019) is: 5.7%    Values used to calculate the score:      Age: 49 years      Sex: Female      Is Non- : No      Diabetic: No      Tobacco smoker: Yes      Systolic Blood Pressure: 132 mmHg      Is BP treated: Yes      HDL Cholesterol: 57 mg/dL      Total Cholesterol: 248 mg/dL    Medical History    Reviewed allergies:  No Known Allergies     Reviewed:  Patient Active Problem List    Diagnosis    Centrilobular emphysema (HCC)    Primary hypertension    HSV infection    NELLA (generalized anxiety disorder)    Dyslipidemia    Nicotine dependence      Reviewed:  Past Medical History:    Anxiety state, unspecified    saw therapist     Bartholin cyst    excision of Batholin's cyst    Herpes    last outbreak - , valtrex    History of abnormal Pap smear    LEEP    History of pregnancy    EAB,     Vulvar hematoma    post op, evac and revision surgery      Reviewed:  Family History   Problem Relation Age of Onset    Lipids Father 63        hyperlipidemia    Prostate Cancer Father 65    Musculo-skelatal Disorder Mother 60        osteoporosis    Colon Cancer Paternal Uncle 60    Diabetes Neg     Heart Disease Neg        Reviewed:  Past Surgical History:   Procedure Laterality Date    Hysteroscopy      Leep      Other surgical history      Excision of Bartholin's cyst    Other surgical history  2010    Evacuation and revision surgery for vulvar hematoma      Reviewed:  Social History     Socioeconomic History    Marital status: Single   Tobacco Use    Smoking status: Every Day     Current packs/day: 0.50     Average packs/day: 0.5 packs/day for 15.0 years (7.5 ttl pk-yrs)     Types: Cigarettes     Passive exposure: Current    Smokeless tobacco: Current    Tobacco comments:     not motivated to quit at this time   Vaping Use    Vaping status: Never Used   Substance and Sexual Activity    Alcohol use: Yes     Alcohol/week: 1.0 - 2.0  standard drink of alcohol     Types: 1 - 2 Glasses of wine per week     Comment: weekly    Drug use: No    Sexual activity: Yes     Partners: Male     Comment: none   Other Topics Concern    Caffeine Concern Yes     Comment: coffee 3 cups daily      Reviewed:  Current Outpatient Medications   Medication Sig Dispense Refill    amLODIPine 5 MG Oral Tab Take 1 tablet (5 mg total) by mouth daily. 90 tablet 9    rosuvastatin 10 MG Oral Tab Take 1 tablet (10 mg total) by mouth nightly. FOR CHOLESTEROL. 90 tablet 9    telmisartan 80 MG Oral Tab Take 1 tablet (80 mg total) by mouth nightly. FOR BLOOD PRESSURE. 90 tablet 9    sertraline 100 MG Oral Tab Take 2 tablets (200 mg total) by mouth daily. FOR ANXIETY. 180 tablet 9    valACYclovir (VALTREX) 500 MG Oral Tab Take 1 tablet (500 mg total) by mouth 2 (two) times daily. 180 tablet 3    tiotropium (SPIRIVA HANDIHALER) 18 MCG Inhalation Cap Inhale 1 capsule (18 mcg total) into the lungs daily. 90 capsule 3    varenicline (CHANTIX) 1 MG Oral Tab Take 1 tablet (1 mg total) by mouth 2 (two) times daily. 60 tablet 6    cefdinir 300 MG Oral Cap Take 1 capsule (300 mg total) by mouth 2 (two) times daily for 10 days. 20 capsule 0          Assessment & Plan    1. Annual physical exam  - Comp Metabolic Panel (14)  - Hemoglobin A1C  - CBC, Platelet; No Differential  - Lipid Panel  - TSH W Reflex To Free T4    2. Primary hypertension  - amLODIPine 5 MG Oral Tab; Take 1 tablet (5 mg total) by mouth daily.  Dispense: 90 tablet; Refill: 9  - telmisartan 80 MG Oral Tab; Take 1 tablet (80 mg total) by mouth nightly. FOR BLOOD PRESSURE.  Dispense: 90 tablet; Refill: 9    3. Dyslipidemia  - rosuvastatin 10 MG Oral Tab; Take 1 tablet (10 mg total) by mouth nightly. FOR CHOLESTEROL.  Dispense: 90 tablet; Refill: 9    4. NELLA (generalized anxiety disorder)  - sertraline 100 MG Oral Tab; Take 2 tablets (200 mg total) by mouth daily. FOR ANXIETY.  Dispense: 180 tablet; Refill: 9    5. HSV  infection  - valACYclovir (VALTREX) 500 MG Oral Tab; Take 1 tablet (500 mg total) by mouth 2 (two) times daily.  Dispense: 180 tablet; Refill: 3    6. Screening for endocrine, nutritional, metabolic and immunity disorder  - Comp Metabolic Panel (14)  - Hemoglobin A1C  - CBC, Platelet; No Differential  - Lipid Panel  - TSH W Reflex To Free T4  - Vitamin D  - Vitamin B12 [E]; Future    7. Vitamin D deficiency  - Vitamin D    8. Colon cancer screening  - [facility] Telephone Colon Screen  - [facility] - INTERNAL    9. Encounter for screening mammogram for breast cancer  - [facility] 2D+3D SCREENING BILAT (CPT=77067/27751); Future    10. HERB (acute kidney injury) (HCC)    11. Centrilobular emphysema (HCC)  - tiotropium (SPIRIVA HANDIHALER) 18 MCG Inhalation Cap; Inhale 1 capsule (18 mcg total) into the lungs daily.  Dispense: 90 capsule; Refill: 3    12. Personal history of nicotine dependence  - varenicline (CHANTIX) 1 MG Oral Tab; Take 1 tablet (1 mg total) by mouth 2 (two) times daily.  Dispense: 60 tablet; Refill: 6    13. Encounter for screening for coronary artery disease  - CT CALCIUM SCORING; Future    14. Pyelonephritis  Plan  Overall doing well today. Screening labs, preventive imaging/procedure, and health care gaps addressed as per USPSTF guidelines, orders available electronically via [facility] and in [facility].  Vaccines discussed and administered depending on availability and per patient preference; patient to return for any outstanding vaccines once available.  Patient brought to  to help schedule care gaps and follow up. Further recommendations depending on lab results.  In terms of the kidney infection she is doing much better, will recheck labs to ensure the HERB has resolved, if she has recurrence of urinary symptoms call office for urine and culture will treat depending on symptoms, she would likely need empiric course of Bactrim but will hold off for now.  Continue with medications as above.  Continue  smoking cessation, offered Chantix, she will think about it.  Mammogram ordered for next year.  Will start her on some Spiriva to help with the fatigue and shortness of breath, she is not having any acute symptoms however hopefully this will for her to quit smoking, if she has no improvement over 3 months can stop.         Follow Up:   Return for 1 YEAR FOR ANNUAL OR DEPENDING ON LAB RESULTS, AS NEEDED/ IF SYMPTOMS WORSEN.      Garry Das MD  Internal Medicine      Patient asked to sign release of information for outside records if not already requested, make future office/imaging appointments at the  prior to leaving, and to sign up for [facility] if not already active.  Preventive measures and further education discussed with patient as per after visit summary. Potential medication side effects discussed. All questions answered to best of ability.   Call office with any questions. Seek emergency care if necessary.   Patient understands and agrees to follow directions and advice.      ----------------------------------------- PATIENT INSTRUCTIONS-----------------------------------------     There are no Patient Instructions on file for this visit.

## 2024-10-17 ENCOUNTER — NURSE TRIAGE (OUTPATIENT)
Facility: LOCATION | Age: 49
End: 2024-10-17

## 2024-10-17 NOTE — TELEPHONE ENCOUNTER
Please reply to pool: EM RN TRIAGE  Action Requested: Summary for Provider     []  Critical Lab, Recommendations Needed  [] Need Additional Advice  [x]   FYI    []   Need Orders  [] Need Medications Sent to Pharmacy  []  Other     SUMMARY: Patient contacts clinic requesting prescription for ambien.  Difficulty falling asleep and staying asleep.  States she has used it in the remote past.  Has most recently been using melatonin which is no longer effective.  Virtual visit scheduled to discuss new medication.    Reason for call: Insomnia  Onset: Data Unavailable                       Reason for Disposition   Difficulty falling to sleep or staying asleep    Protocols used: Insomnia-A-[place]

## 2024-11-07 ENCOUNTER — NURSE TRIAGE (OUTPATIENT)
Facility: LOCATION | Age: 49
End: 2024-11-07

## 2024-11-07 NOTE — TELEPHONE ENCOUNTER
Action Requested: Summary for Provider     []  Critical Lab, Recommendations Needed  [] Need Additional Advice  []   FYI    []   Need Orders  [] Need Medications Sent to Pharmacy  []  Other     SUMMARY:     an appointment was offered and refused.   She is asking for a urine test  to be ordered.    Per the patient she had these symptoms before and ended up in the emergency room.    Since Sunday she has been having 2/10 lower abdominal pain with urinary frequency and urgency.    Denies a fever, blood in the urine, smelly urine, burning with urination.  She does have vaginal discharge but not more than usual.        Instructed the patient to push fluids, wipe front to back after using the toilet, wear loose clothing, cotton underwear,  Avoid caffeine, alcohol, and spice foods, and avoid hot tub baths with understanding.     Reason for call: Urinary Symptoms  Onset: Data Unavailable      General Assessment (questions to ask the caller)  Do you consider this a medical emergency?: No  Can you access 911?: Yes  Do you have any pain?: Yes (lower back pain)  What is the severity of your pain? (Scale 1-10): 2  Do you have a fever?: No  What is the date of your last medical exam?: 10/17/24  Additional Assessments  Are these symptoms new, recurrent, or chronic?: new (started on Sunday)            Reason for Disposition   Side (flank) or lower back pain present    Protocols used: Urinary Symptoms-A-[place]

## 2024-11-07 NOTE — TELEPHONE ENCOUNTER
See Cheryl Deras for urinalysis and treatment options. She may benefit from limited pelvic if discharge is present, could be BV or alternative.

## 2024-11-07 NOTE — TELEPHONE ENCOUNTER
Patient contacted. Recommendation from Dr. Das relayed.   Pt verbalized understanding/compliance.  Appointment made for tomorrow-patient soonest availability. No further questions/concerns at this time.      Future Appointments   Date Time Provider Department Center   11/8/2024  9:00 AM Cheryl Deras, [facility]

## 2024-11-08 ENCOUNTER — OFFICE VISIT (OUTPATIENT)
Facility: LOCATION | Age: 49
End: 2024-11-08

## 2024-11-08 VITALS
OXYGEN SATURATION: 98 % | RESPIRATION RATE: 18 BRPM | HEIGHT: 68 IN | TEMPERATURE: 98 F | HEART RATE: 84 BPM | SYSTOLIC BLOOD PRESSURE: 126 MMHG | WEIGHT: 144 LBS | BODY MASS INDEX: 21.82 KG/M2 | DIASTOLIC BLOOD PRESSURE: 76 MMHG

## 2024-11-08 DIAGNOSIS — R31.9 URINARY TRACT INFECTION WITH HEMATURIA, SITE UNSPECIFIED: Primary | ICD-10-CM

## 2024-11-08 DIAGNOSIS — N39.0 URINARY TRACT INFECTION WITH HEMATURIA, SITE UNSPECIFIED: Primary | ICD-10-CM

## 2024-11-08 LAB
BILIRUBIN: NEGATIVE
GLUCOSE (URINE DIPSTICK): NEGATIVE MG/DL
KETONES (URINE DIPSTICK): NEGATIVE MG/DL
LEUKOCYTES: NEGATIVE
MULTISTIX LOT#: ABNORMAL NUMERIC
NITRITE, URINE: NEGATIVE
PH, URINE: 6 (ref 4.5–8)
PROTEIN (URINE DIPSTICK): NEGATIVE MG/DL
SPECIFIC GRAVITY: 1.02 (ref 1–1.03)
URINE-COLOR: YELLOW
UROBILINOGEN,SEMI-QN: 0.2 MG/DL (ref 0–1.9)

## 2024-11-08 PROCEDURE — 81002 URINALYSIS NONAUTO W/O SCOPE: CPT | Performed by: NURSE PRACTITIONER

## 2024-11-08 PROCEDURE — 3008F BODY MASS INDEX DOCD: CPT | Performed by: NURSE PRACTITIONER

## 2024-11-08 PROCEDURE — 99213 OFFICE O/P EST LOW 20 MIN: CPT | Performed by: NURSE PRACTITIONER

## 2024-11-08 PROCEDURE — 3078F DIAST BP <80 MM HG: CPT | Performed by: NURSE PRACTITIONER

## 2024-11-08 PROCEDURE — 3074F SYST BP LT 130 MM HG: CPT | Performed by: NURSE PRACTITIONER

## 2024-11-08 RX ORDER — SULFAMETHOXAZOLE AND TRIMETHOPRIM 800; 160 MG/1; MG/1
1 TABLET ORAL 2 TIMES DAILY
Qty: 6 TABLET | Refills: 0 | Status: SHIPPED | OUTPATIENT
Start: 2024-11-08 | End: 2024-11-11

## 2024-11-08 NOTE — PROGRESS NOTES
INTERNAL MEDICINE OFFICE NOTE     Patient ID: Rocio Escobar is a 49 year old female.  Today's Date: 11/08/24  Chief Complaint: Infection (Had a kidney infections a few months ago   New symptoms have started Sunday)    HPI  Rocio Escobar is a 49 year old female who presents with symptoms of UTI. Complaining of urinary frequency, urgency, dysuria for last 5 days.  Denies flank pain, fever, hematuria, nausea, or vomiting.  Denies abdominal pain, pelvic pain, vaginal discharge, bleeding, itching, or concerns for std's. She reports having pyelonephritis about 3 months ago. Tolerates PO well at home. No n/v/d.  Denies any other aggravating or relieving factors at home. Denies any other treatment attempts prior to arrival.      Vitals:    11/08/24 0926   BP: 126/76   Pulse: 84   Resp: 18   Temp: 97.9 °F (36.6 °C)   TempSrc: Oral   SpO2: 98%   Weight: 144 lb (65.3 kg)   Height: 5' 8\" (1.727 m)     body mass index is 21.9 kg/m².  BP Readings from Last 3 Encounters:   11/08/24 126/76   09/30/24 132/78   09/20/24 125/77     The 10-year ASCVD risk score (Jayro BARKLEY, et al., 2019) is: 6.8%    Values used to calculate the score:      Age: 49 years      Sex: Female      Is Non- : No      Diabetic: No      Tobacco smoker: Yes      Systolic Blood Pressure: 126 mmHg      Is BP treated: Yes      HDL Cholesterol: 49 mg/dL      Total Cholesterol: 258 mg/dL  Medications reviewed:  Current Outpatient Medications   Medication Sig Dispense Refill    sulfamethoxazole-trimethoprim -160 MG Oral Tab per tablet Take 1 tablet by mouth 2 (two) times daily for 3 days. 6 tablet 0    zolpidem 5 MG Oral Tab Take 1 tablet (5 mg total) by mouth nightly as needed for Sleep. 30 tablet 1    amLODIPine 5 MG Oral Tab Take 1 tablet (5 mg total) by mouth daily. 90 tablet 9    rosuvastatin 10 MG Oral Tab Take 1 tablet (10 mg total) by mouth nightly. FOR CHOLESTEROL. 90 tablet 9    telmisartan 80 MG  Oral Tab Take 1 tablet (80 mg total) by mouth nightly. FOR BLOOD PRESSURE. 90 tablet 9    sertraline 100 MG Oral Tab Take 2 tablets (200 mg total) by mouth daily. FOR ANXIETY. 180 tablet 9    valACYclovir (VALTREX) 500 MG Oral Tab Take 1 tablet (500 mg total) by mouth 2 (two) times daily. 180 tablet 3    tiotropium (SPIRIVA HANDIHALER) 18 MCG Inhalation Cap Inhale 1 capsule (18 mcg total) into the lungs daily. 90 capsule 3    varenicline (CHANTIX) 1 MG Oral Tab Take 1 tablet (1 mg total) by mouth 2 (two) times daily. 60 tablet 6         Assessment & Plan    1. Urinary tract infection with hematuria, site unspecified (Primary)  -     POC Urinalysis, Manual Dip without microscopy [[id number]]  -     Urine Culture, Routine; Future; Expected date: 11/08/2024  -     Urine Culture, Routine  -     Sulfamethoxazole-Trimethoprim; Take 1 tablet by mouth 2 (two) times daily for 3 days.  Dispense: 6 tablet; Refill: 0      Urine dip: blood  Urine culture sent to lab.      Discussed HPI and physical exam with pt. Pt has a reassuring physical exam consistent with a lower uti. Treatment options discussed with patient and explained in detail. Will start bactrim today, pending urine culture results. The risks, benefits and potential side effects of the medications were reviewed. Alternatives were discussed. Monitoring parameters and expected course outlined. We discussed signs and symptoms that should prompt her to go to the emergency department immediately for evaluation including any fevers, flank pain, abdominal pain, vomiting, vaginal bleeding or if she has any concerns. Patient to call us, or go to emergency department if symptoms fail to respond as outlined, or worsen in any way. The patient agreed with the plan.       Follow Up: Return for AS NEEDED/IF SYMPTOMS WORSEN..         Objective: Results:   Physical Exam   Reviewed:    Patient Active Problem List    Diagnosis    Centrilobular emphysema (HCC)    Primary hypertension    HSV  infection    NELLA (generalized anxiety disorder)    Dyslipidemia    Nicotine dependence      Allergies[1]     Social History     Socioeconomic History    Marital status: Single   Tobacco Use    Smoking status: Every Day     Current packs/day: 0.50     Average packs/day: 0.5 packs/day for 15.0 years (7.5 ttl pk-yrs)     Types: Cigarettes     Passive exposure: Current    Smokeless tobacco: Current    Tobacco comments:     not motivated to quit at this time   Vaping Use    Vaping status: Never Used   Substance and Sexual Activity    Alcohol use: Yes     Alcohol/week: 1.0 - 2.0 standard drink of alcohol     Types: 1 - 2 Glasses of wine per week     Comment: weekly    Drug use: No    Sexual activity: Yes     Partners: Male     Comment: none   Other Topics Concern    Caffeine Concern Yes     Comment: coffee 3 cups daily      Review of Systems  All other systems negative unless otherwise stated in ROS or HPI above.       JESSICA Galloway  Internal Medicine       Call office with any questions or seek emergency care if necessary.   Patient understands and agrees to follow directions and advice.      ----------------------------------------- PATIENT INSTRUCTIONS-----------------------------------------   .    Patient Instructions   Take Bactrim antibiotic as prescribed.  You can take this with a snack or with food to help avoid upset stomach.  Drink plenty water and stay well-hydrated.  You can take Tylenol or ibuprofen for pain or fevers.  We will send your urine to the lab for culture, and notify you when the results are available.  At that time we will make any medication changes necessary.               [1] No Known Allergies

## 2024-11-08 NOTE — PATIENT INSTRUCTIONS
Take Bactrim antibiotic as prescribed.  You can take this with a snack or with food to help avoid upset stomach.  Drink plenty water and stay well-hydrated.  You can take Tylenol or ibuprofen for pain or fevers.  We will send your urine to the lab for culture, and notify you when the results are available.  At that time we will make any medication changes necessary.

## 2024-12-31 ENCOUNTER — OFFICE VISIT (OUTPATIENT)
Facility: LOCATION | Age: 49
End: 2024-12-31

## 2024-12-31 VITALS
SYSTOLIC BLOOD PRESSURE: 119 MMHG | RESPIRATION RATE: 16 BRPM | WEIGHT: 142.38 LBS | HEART RATE: 76 BPM | OXYGEN SATURATION: 99 % | BODY MASS INDEX: 21.58 KG/M2 | DIASTOLIC BLOOD PRESSURE: 76 MMHG | HEIGHT: 68 IN

## 2024-12-31 DIAGNOSIS — R30.0 DYSURIA: ICD-10-CM

## 2024-12-31 DIAGNOSIS — N39.0 URINARY TRACT INFECTION WITH HEMATURIA, SITE UNSPECIFIED: Primary | ICD-10-CM

## 2024-12-31 DIAGNOSIS — R31.9 URINARY TRACT INFECTION WITH HEMATURIA, SITE UNSPECIFIED: Primary | ICD-10-CM

## 2024-12-31 LAB
BILIRUBIN: NEGATIVE
GLUCOSE (URINE DIPSTICK): NEGATIVE MG/DL
KETONES (URINE DIPSTICK): NEGATIVE MG/DL
LEUKOCYTES: NEGATIVE
MULTISTIX LOT#: ABNORMAL NUMERIC
NITRITE, URINE: NEGATIVE
PH, URINE: 6 (ref 4.5–8)
PROTEIN (URINE DIPSTICK): NEGATIVE MG/DL
SPECIFIC GRAVITY: 1.03 (ref 1–1.03)
URINE-COLOR: YELLOW
UROBILINOGEN,SEMI-QN: 0.2 MG/DL (ref 0–1.9)

## 2024-12-31 PROCEDURE — 3008F BODY MASS INDEX DOCD: CPT | Performed by: NURSE PRACTITIONER

## 2024-12-31 PROCEDURE — 3074F SYST BP LT 130 MM HG: CPT | Performed by: NURSE PRACTITIONER

## 2024-12-31 PROCEDURE — 99213 OFFICE O/P EST LOW 20 MIN: CPT | Performed by: NURSE PRACTITIONER

## 2024-12-31 PROCEDURE — 81002 URINALYSIS NONAUTO W/O SCOPE: CPT | Performed by: NURSE PRACTITIONER

## 2024-12-31 PROCEDURE — 3078F DIAST BP <80 MM HG: CPT | Performed by: NURSE PRACTITIONER

## 2024-12-31 RX ORDER — SULFAMETHOXAZOLE AND TRIMETHOPRIM 800; 160 MG/1; MG/1
1 TABLET ORAL 2 TIMES DAILY
Qty: 6 TABLET | Refills: 0 | Status: SHIPPED | OUTPATIENT
Start: 2024-12-31 | End: 2025-01-03

## 2024-12-31 NOTE — PROGRESS NOTES
INTERNAL MEDICINE OFFICE NOTE     Patient ID: Rocio Escobar is a 49 year old female.  Today's Date: 12/31/24  Chief Complaint: Back Pain (Lower back pain started Sunday night /Last time it was an UTI )    HPI  Rocio Escobar is a 49 year old female who presents with symptoms of UTI. Complaining of urinary frequency, urgency, dysuria for last 2-3 days.  She also reports some lower right side back pain. Denies flank pain, fever, hematuria, nausea, or vomiting.  Denies abdominal pain, pelvic pain, vaginal discharge, bleeding, itching. Tolerates PO well at home. No n/v/d. She did report having stomach flu with diarrhea last week.  Denies any other aggravating or relieving factors at home. Denies any other treatment attempts prior to arrival. Has history of UTI and pyelo recently.      Vitals:    12/31/24 0835   BP: 119/76   Pulse: 76   Resp: 16   SpO2: 99%   Weight: 142 lb 6.4 oz (64.6 kg)   Height: 5' 8\" (1.727 m)     body mass index is 21.65 kg/m².  BP Readings from Last 3 Encounters:   12/31/24 119/76   11/08/24 126/76   09/30/24 132/78     The 10-year ASCVD risk score (Jayro BARKLEY, et al., 2019) is: 6.1%    Values used to calculate the score:      Age: 49 years      Sex: Female      Is Non- : No      Diabetic: No      Tobacco smoker: Yes      Systolic Blood Pressure: 119 mmHg      Is BP treated: Yes      HDL Cholesterol: 49 mg/dL      Total Cholesterol: 258 mg/dL  Medications reviewed:  Current Outpatient Medications   Medication Sig Dispense Refill    sulfamethoxazole-trimethoprim -160 MG Oral Tab per tablet Take 1 tablet by mouth 2 (two) times daily for 3 days. 6 tablet 0    zolpidem 5 MG Oral Tab Take 1 tablet (5 mg total) by mouth nightly as needed for Sleep. 30 tablet 1    amLODIPine 5 MG Oral Tab Take 1 tablet (5 mg total) by mouth daily. 90 tablet 9    rosuvastatin 10 MG Oral Tab Take 1 tablet (10 mg total) by mouth nightly. FOR CHOLESTEROL. 90  tablet 9    telmisartan 80 MG Oral Tab Take 1 tablet (80 mg total) by mouth nightly. FOR BLOOD PRESSURE. 90 tablet 9    sertraline 100 MG Oral Tab Take 2 tablets (200 mg total) by mouth daily. FOR ANXIETY. 180 tablet 9    valACYclovir (VALTREX) 500 MG Oral Tab Take 1 tablet (500 mg total) by mouth 2 (two) times daily. 180 tablet 3    tiotropium (SPIRIVA HANDIHALER) 18 MCG Inhalation Cap Inhale 1 capsule (18 mcg total) into the lungs daily. 90 capsule 3    varenicline (CHANTIX) 1 MG Oral Tab Take 1 tablet (1 mg total) by mouth 2 (two) times daily. (Patient not taking: Reported on 12/31/2024) 60 tablet 6         Assessment & Plan    1. Urinary tract infection with hematuria, site unspecified (Primary)  -     Sulfamethoxazole-Trimethoprim; Take 1 tablet by mouth 2 (two) times daily for 3 days.  Dispense: 6 tablet; Refill: 0  2. Dysuria  -     POC Urinalysis, Manual Dip without microscopy [[id number]]  -     Urine Culture, Routine; Future; Expected date: 12/31/2024  -     Urine Culture, Routine      Plan  Urine dip: + blood  Urine culture sent to lab.  Urine hcg: ablation    Discussed HPI and physical exam with pt. Pt has a reassuring physical exam consistent with a lower uti. Recent history of UTI and pyelo. Treatment options discussed with patient and explained in detail. Will start bactrim pending urine culture results. The risks, benefits and potential side effects of the medications were reviewed. Alternatives were discussed. Monitoring parameters and expected course outlined. We discussed signs and symptoms that should prompt her to go to the emergency department immediately for evaluation including any fevers, flank pain, abdominal pain, vomiting, vaginal bleeding or if she has any concerns. Patient to call PCP or go to emergency department if symptoms fail to respond as outlined, or worsen in any way. Will consider imaging for kidney stone if symptoms persist or worsen. The patient agreed with the plan.       Follow  Up: Return for AS NEEDED/IF SYMPTOMS WORSEN..         Objective: Results:   Physical Exam  Constitutional:       Appearance: Normal appearance. She is not toxic-appearing.   HENT:      Head: Normocephalic and atraumatic.   Cardiovascular:      Rate and Rhythm: Normal rate and regular rhythm.      Heart sounds: Normal heart sounds.   Pulmonary:      Effort: Pulmonary effort is normal.      Breath sounds: Normal breath sounds.   Abdominal:      General: Abdomen is flat. Bowel sounds are normal.      Palpations: Abdomen is soft.      Tenderness: There is no abdominal tenderness. There is no right CVA tenderness or left CVA tenderness.   Skin:     General: Skin is warm and dry.   Neurological:      Mental Status: She is alert.        Reviewed:    Patient Active Problem List    Diagnosis    Centrilobular emphysema (HCC)    Primary hypertension    HSV infection    NELLA (generalized anxiety disorder)    Dyslipidemia    Nicotine dependence      Allergies[1]     Social History     Socioeconomic History    Marital status: Single   Tobacco Use    Smoking status: Every Day     Current packs/day: 0.50     Average packs/day: 0.5 packs/day for 15.0 years (7.5 ttl pk-yrs)     Types: Cigarettes     Passive exposure: Current    Smokeless tobacco: Current    Tobacco comments:     not motivated to quit at this time   Vaping Use    Vaping status: Never Used   Substance and Sexual Activity    Alcohol use: Yes     Alcohol/week: 1.0 - 2.0 standard drink of alcohol     Types: 1 - 2 Glasses of wine per week     Comment: weekly    Drug use: No    Sexual activity: Yes     Partners: Male     Comment: none   Other Topics Concern    Caffeine Concern Yes     Comment: coffee 3 cups daily      Review of Systems   Constitutional:  Negative for fever.   HENT: Negative.     Respiratory: Negative.     Cardiovascular: Negative.    Gastrointestinal:  Negative for abdominal pain, diarrhea, nausea and vomiting.   Genitourinary:  Positive for dysuria and  frequency.   Musculoskeletal:  Positive for back pain.   Skin: Negative.      All other systems negative unless otherwise stated in ROS or HPI above.       JESSICA Galloway  Internal Medicine       Call office with any questions or seek emergency care if necessary.   Patient understands and agrees to follow directions and advice.      ----------------------------------------- PATIENT INSTRUCTIONS-----------------------------------------   .    There are no Patient Instructions on file for this visit.             [1] No Known Allergies

## 2025-01-23 ENCOUNTER — APPOINTMENT (OUTPATIENT)
Dept: ULTRASOUND IMAGING | Facility: HOSPITAL | Age: 50
End: 2025-01-23
Attending: EMERGENCY MEDICINE
Payer: COMMERCIAL

## 2025-01-23 ENCOUNTER — HOSPITAL ENCOUNTER (EMERGENCY)
Facility: HOSPITAL | Age: 50
Discharge: HOME | End: 2025-01-23
Attending: EMERGENCY MEDICINE
Payer: COMMERCIAL

## 2025-01-23 VITALS
TEMPERATURE: 98 F | HEIGHT: 67 IN | SYSTOLIC BLOOD PRESSURE: 105 MMHG | HEART RATE: 96 BPM | OXYGEN SATURATION: 97 % | DIASTOLIC BLOOD PRESSURE: 73 MMHG | WEIGHT: 140 LBS | RESPIRATION RATE: 21 BRPM | BODY MASS INDEX: 21.97 KG/M2

## 2025-01-23 DIAGNOSIS — R10.9 FLANK PAIN: Primary | ICD-10-CM

## 2025-01-23 DIAGNOSIS — N30.01 ACUTE CYSTITIS WITH HEMATURIA: ICD-10-CM

## 2025-01-23 LAB
BILIRUB UR QL: NEGATIVE
COLOR UR: YELLOW
GLUCOSE UR-MCNC: NORMAL MG/DL
KETONES UR-MCNC: NEGATIVE MG/DL
LEUKOCYTE ESTERASE UR QL STRIP.AUTO: NEGATIVE
NITRITE UR QL STRIP.AUTO: NEGATIVE
PH UR: 5.5 [PH] (ref 5–8)
PROT UR-MCNC: 70 MG/DL
RBC #/AREA URNS AUTO: >10 /HPF
SP GR UR STRIP: 1.01 (ref 1–1.03)
UROBILINOGEN UR STRIP-ACNC: NORMAL

## 2025-01-23 PROCEDURE — 81001 URINALYSIS AUTO W/SCOPE: CPT | Performed by: EMERGENCY MEDICINE

## 2025-01-23 PROCEDURE — 99284 EMERGENCY DEPT VISIT MOD MDM: CPT

## 2025-01-23 PROCEDURE — S0119 ONDANSETRON 4 MG: HCPCS | Performed by: EMERGENCY MEDICINE

## 2025-01-23 PROCEDURE — 96372 THER/PROPH/DIAG INJ SC/IM: CPT

## 2025-01-23 PROCEDURE — 76770 US EXAM ABDO BACK WALL COMP: CPT | Performed by: EMERGENCY MEDICINE

## 2025-01-23 PROCEDURE — 87086 URINE CULTURE/COLONY COUNT: CPT | Performed by: EMERGENCY MEDICINE

## 2025-01-23 RX ORDER — CEFDINIR 300 MG/1
300 CAPSULE ORAL 2 TIMES DAILY
Qty: 20 CAPSULE | Refills: 0 | Status: SHIPPED | OUTPATIENT
Start: 2025-01-23 | End: 2025-02-02

## 2025-01-23 RX ORDER — NAPROXEN 500 MG/1
500 TABLET ORAL 2 TIMES DAILY WITH MEALS
Qty: 10 TABLET | Refills: 0 | Status: SHIPPED | OUTPATIENT
Start: 2025-01-23 | End: 2025-01-28

## 2025-01-23 RX ORDER — KETOROLAC TROMETHAMINE 30 MG/ML
30 INJECTION, SOLUTION INTRAMUSCULAR; INTRAVENOUS ONCE
Status: COMPLETED | OUTPATIENT
Start: 2025-01-23 | End: 2025-01-23

## 2025-01-23 RX ORDER — HYDROCODONE BITARTRATE AND ACETAMINOPHEN 5; 325 MG/1; MG/1
2 TABLET ORAL ONCE
Status: DISCONTINUED | OUTPATIENT
Start: 2025-01-23 | End: 2025-01-23

## 2025-01-23 RX ORDER — ONDANSETRON 4 MG/1
4 TABLET, ORALLY DISINTEGRATING ORAL EVERY 8 HOURS PRN
Qty: 6 TABLET | Refills: 0 | Status: SHIPPED | OUTPATIENT
Start: 2025-01-23

## 2025-01-23 RX ORDER — ONDANSETRON 4 MG/1
4 TABLET, ORALLY DISINTEGRATING ORAL ONCE
Status: COMPLETED | OUTPATIENT
Start: 2025-01-23 | End: 2025-01-23

## 2025-01-23 NOTE — ED QUICK NOTES
Reviewed discharge instructions with patient and paperwork provided. Patient verbalized understanding. VSS. Patient ambulated out of ED with steady gait.

## 2025-01-23 NOTE — ED INITIAL ASSESSMENT (HPI)
Pt A/Ox4 ambulatory presented to ED c/o R lower back pain with frequency with urination starting at 0200hrs. Stated has hx of kidney infections. Denied fevers, chills. No relief with tylenol medication at home.

## 2025-01-23 NOTE — ED PROVIDER NOTES
Patient Seen in: [facility] Emergency Department      History     Chief Complaint   Patient presents with    Low Back Pain     Stated Complaint: back pain    Subjective:   HPI      49-year-old female here a few weeks ago with UTI and then previously before without a history of kidney stones who presents with right lower back/flank pain.  No abdominal surgical history.  No fever.  Mild nausea.  No trauma.    Objective:     Past Medical History:    Anxiety state, unspecified    saw therapist     Bartholin cyst    excision of Batholin's cyst    Herpes    last outbreak - , valtrex    History of abnormal Pap smear    LEEP    History of pregnancy    EAB,     Vulvar hematoma    post op, evac and revision surgery              Past Surgical History:   Procedure Laterality Date    Hysteroscopy      Leep      Other surgical history      Excision of Bartholin's cyst    Other surgical history  2010    Evacuation and revision surgery for vulvar hematoma                Social History     Socioeconomic History    Marital status: Single   Tobacco Use    Smoking status: Every Day     Current packs/day: 0.50     Average packs/day: 0.5 packs/day for 15.0 years (7.5 ttl pk-yrs)     Types: Cigarettes     Passive exposure: Current    Smokeless tobacco: Current    Tobacco comments:     not motivated to quit at this time   Vaping Use    Vaping status: Never Used   Substance and Sexual Activity    Alcohol use: Yes     Alcohol/week: 1.0 - 2.0 standard drink of alcohol     Types: 1 - 2 Glasses of wine per week     Comment: weekly    Drug use: No    Sexual activity: Yes     Partners: Male     Comment: none   Other Topics Concern    Caffeine Concern Yes     Comment: coffee 3 cups daily                  Physical Exam     ED Triage Vitals [25 0335]   /73   Pulse 96   Resp 21   Temp 98.1 °F (36.7 °C)   Temp src Oral   SpO2 97 %   O2 Device None (Room air)       Current Vitals:   Vital Signs  BP:  105/73  Pulse: 96  Resp: 21  Temp: 98.1 °F (36.7 °C)  Temp src: Oral    Oxygen Therapy  SpO2: 97 %  O2 Device: None (Room air)        Physical Exam  Constitutional: Oriented to person, place, and time.  Appears well-developed. No distress.   Head: Normocephalic and atraumatic.   Eyes: Conjunctivae are normal. Pupils are equal, round, and reactive to light.   Cardiovascular: Normal rate, regular rhythm and intact distal pulses.    Pulmonary/Chest: Effort normal. No respiratory distress.   Abdominal: Soft.  Mild suprapubic pain.  No distention.  Pain in the right lower flank region.  No other focal pain.  No guarding  Musculoskeletal: Normal range of motion. No edema or tenderness.   Neurological: Alert and oriented to person, place, and time.   Skin: Skin is warm and dry.   Nursing note and vitals reviewed.    Differential diagnosis includes UTI, renal colic and obstructive uropathy.      ED Course     Labs Reviewed   URINALYSIS WITH CULTURE REFLEX - Abnormal; Notable for the following components:       Result Value    Clarity Urine Turbid (*)     Blood Urine 1+ (*)     Protein Urine 70 (*)     WBC Urine 6-10 (*)     RBC Urine >10 (*)     Squamous Epi. Cells Few (*)     All other components within normal limits   URINE CULTURE, ROUTINE            ULTRASOUND KIDNEYS    Comparison: CT abdomen/pelvis 9/20/2024    IMPRESSION:  Normal sonographic appearance of the bilateral kidneys.  No hydronephrosis.  No identified echogenic shadowing renal stones.        Report finalized at 06:08 AM ET    Dr. Kike Perkins MD         [facility]              Medical Decision Making  Ultrasound and urine is noted.  Patient feels much better.  Will do a course of antibiotics and some nausea medication.  I will give her something stronger at home for pain.  She does need to follow-up with her doctor.  May need urology follow-up if symptoms continue or persist.  She looks good.  No indication for admission.  She will come back with worsening  or change    Problems Addressed:  Acute cystitis with hematuria: acute illness or injury with systemic symptoms  Flank pain: acute illness or injury    Amount and/or Complexity of Data Reviewed  Labs: ordered. Decision-making details documented in ED Course.  Radiology: ordered and independent interpretation performed. Decision-making details documented in ED Course.     Details: By my gross review of the kidney ultrasound I do not appreciate gross and obvious evidence of significant hydronephrosis    Risk  OTC drugs.  Prescription drug management.  Decision regarding hospitalization.        Disposition and Plan     Clinical Impression:  1. Flank pain    2. Acute cystitis with hematuria         Disposition:  Discharge  1/23/2025  5:11 am    Follow-up:  Garry Das MD  [address] [facility] [address]  [phone]    Call            Medications Prescribed:  Current Discharge Medication List        START taking these medications    Details   cefdinir 300 MG Oral Cap Take 1 capsule (300 mg total) by mouth 2 (two) times daily for 10 days.  Qty: 20 capsule, Refills: 0      ondansetron 4 MG Oral Tablet Dispersible Take 1 tablet (4 mg total) by mouth every 8 (eight) hours as needed for Nausea.  Qty: 6 tablet, Refills: 0      naproxen 500 MG Oral Tab Take 1 tablet (500 mg total) by mouth 2 (two) times daily with meals for 5 days.  Qty: 10 tablet, Refills: 0                 Supplementary Documentation:

## 2025-02-12 ENCOUNTER — HOSPITAL ENCOUNTER (EMERGENCY)
Facility: HOSPITAL | Age: 50
Discharge: HOME | End: 2025-02-12
Attending: EMERGENCY MEDICINE
Payer: COMMERCIAL

## 2025-02-12 VITALS
WEIGHT: 140 LBS | SYSTOLIC BLOOD PRESSURE: 135 MMHG | HEIGHT: 67 IN | OXYGEN SATURATION: 98 % | BODY MASS INDEX: 21.97 KG/M2 | RESPIRATION RATE: 18 BRPM | DIASTOLIC BLOOD PRESSURE: 75 MMHG | HEART RATE: 66 BPM | TEMPERATURE: 97 F

## 2025-02-12 DIAGNOSIS — N23 RENAL COLIC: Primary | ICD-10-CM

## 2025-02-12 LAB
ALBUMIN SERPL-MCNC: 4.3 G/DL (ref 3.2–4.8)
ALP LIVER SERPL-CCNC: 55 U/L
ALT SERPL-CCNC: 20 U/L
ANION GAP SERPL CALC-SCNC: 5 MMOL/L (ref 0–18)
AST SERPL-CCNC: 22 U/L (ref ?–34)
B-HCG UR QL: NEGATIVE
BASOPHILS # BLD AUTO: 0.04 X10(3) UL (ref 0–0.2)
BASOPHILS NFR BLD AUTO: 0.4 %
BILIRUB DIRECT SERPL-MCNC: <0.1 MG/DL (ref ?–0.3)
BILIRUB SERPL-MCNC: 0.3 MG/DL (ref 0.3–1.2)
BILIRUB UR QL: NEGATIVE
BUN BLD-MCNC: 22 MG/DL (ref 9–23)
BUN/CREAT SERPL: 14.8 (ref 10–20)
CALCIUM BLD-MCNC: 8.7 MG/DL (ref 8.7–10.4)
CHLORIDE SERPL-SCNC: 108 MMOL/L (ref 98–112)
CO2 SERPL-SCNC: 25 MMOL/L (ref 21–32)
CREAT BLD-MCNC: 1.49 MG/DL
DEPRECATED RDW RBC AUTO: 44.3 FL (ref 35.1–46.3)
EGFRCR SERPLBLD CKD-EPI 2021: 43 ML/MIN/1.73M2 (ref 60–?)
EOSINOPHIL # BLD AUTO: 0.09 X10(3) UL (ref 0–0.7)
EOSINOPHIL NFR BLD AUTO: 0.9 %
ERYTHROCYTE [DISTWIDTH] IN BLOOD BY AUTOMATED COUNT: 13 % (ref 11–15)
GLUCOSE BLD-MCNC: 94 MG/DL (ref 70–99)
GLUCOSE UR-MCNC: NORMAL MG/DL
HCT VFR BLD AUTO: 38.9 %
HGB BLD-MCNC: 13.3 G/DL
HYALINE CASTS #/AREA URNS AUTO: PRESENT /LPF
IMM GRANULOCYTES # BLD AUTO: 0.03 X10(3) UL (ref 0–1)
IMM GRANULOCYTES NFR BLD: 0.3 %
KETONES UR-MCNC: NEGATIVE MG/DL
LEUKOCYTE ESTERASE UR QL STRIP.AUTO: NEGATIVE
LYMPHOCYTES # BLD AUTO: 1.99 X10(3) UL (ref 1–4)
LYMPHOCYTES NFR BLD AUTO: 20.7 %
MCH RBC QN AUTO: 31.7 PG (ref 26–34)
MCHC RBC AUTO-ENTMCNC: 34.2 G/DL (ref 31–37)
MCV RBC AUTO: 92.8 FL
MONOCYTES # BLD AUTO: 1.1 X10(3) UL (ref 0.1–1)
MONOCYTES NFR BLD AUTO: 11.4 %
NEUTROPHILS # BLD AUTO: 6.38 X10 (3) UL (ref 1.5–7.7)
NEUTROPHILS # BLD AUTO: 6.38 X10(3) UL (ref 1.5–7.7)
NEUTROPHILS NFR BLD AUTO: 66.3 %
NITRITE UR QL STRIP.AUTO: NEGATIVE
OSMOLALITY SERPL CALC.SUM OF ELEC: 289 MOSM/KG (ref 275–295)
PH UR: 5.5 [PH] (ref 5–8)
PLATELET # BLD AUTO: 200 10(3)UL (ref 150–450)
POTASSIUM SERPL-SCNC: 5.1 MMOL/L (ref 3.5–5.1)
PROT SERPL-MCNC: 6.9 G/DL (ref 5.7–8.2)
PROT UR-MCNC: 70 MG/DL
RBC # BLD AUTO: 4.19 X10(6)UL
RBC #/AREA URNS AUTO: >10 /HPF
SODIUM SERPL-SCNC: 138 MMOL/L (ref 136–145)
SP GR UR STRIP: 1.01 (ref 1–1.03)
UROBILINOGEN UR STRIP-ACNC: NORMAL
WBC # BLD AUTO: 9.6 X10(3) UL (ref 4–11)

## 2025-02-12 PROCEDURE — 80048 BASIC METABOLIC PNL TOTAL CA: CPT | Performed by: EMERGENCY MEDICINE

## 2025-02-12 PROCEDURE — 96374 THER/PROPH/DIAG INJ IV PUSH: CPT

## 2025-02-12 PROCEDURE — 81025 URINE PREGNANCY TEST: CPT

## 2025-02-12 PROCEDURE — 99284 EMERGENCY DEPT VISIT MOD MDM: CPT

## 2025-02-12 PROCEDURE — 96375 TX/PRO/DX INJ NEW DRUG ADDON: CPT

## 2025-02-12 PROCEDURE — 85025 COMPLETE CBC W/AUTO DIFF WBC: CPT | Performed by: EMERGENCY MEDICINE

## 2025-02-12 PROCEDURE — 80076 HEPATIC FUNCTION PANEL: CPT | Performed by: EMERGENCY MEDICINE

## 2025-02-12 PROCEDURE — 81001 URINALYSIS AUTO W/SCOPE: CPT | Performed by: EMERGENCY MEDICINE

## 2025-02-12 RX ORDER — ONDANSETRON 2 MG/ML
4 INJECTION INTRAMUSCULAR; INTRAVENOUS ONCE
Status: COMPLETED | OUTPATIENT
Start: 2025-02-12 | End: 2025-02-12

## 2025-02-12 RX ORDER — ACETAMINOPHEN 500 MG
1000 TABLET ORAL ONCE
Status: COMPLETED | OUTPATIENT
Start: 2025-02-12 | End: 2025-02-12

## 2025-02-12 RX ORDER — ONDANSETRON 4 MG/1
4 TABLET, ORALLY DISINTEGRATING ORAL EVERY 4 HOURS PRN
Qty: 10 TABLET | Refills: 0 | Status: SHIPPED | OUTPATIENT
Start: 2025-02-12 | End: 2025-02-19

## 2025-02-12 RX ORDER — KETOROLAC TROMETHAMINE 10 MG/1
10 TABLET, FILM COATED ORAL EVERY 6 HOURS PRN
Qty: 28 TABLET | Refills: 0 | Status: SHIPPED | OUTPATIENT
Start: 2025-02-12 | End: 2025-02-19

## 2025-02-12 RX ORDER — KETOROLAC TROMETHAMINE 15 MG/ML
15 INJECTION, SOLUTION INTRAMUSCULAR; INTRAVENOUS ONCE
Status: COMPLETED | OUTPATIENT
Start: 2025-02-12 | End: 2025-02-12

## 2025-02-12 NOTE — ED PROVIDER NOTES
Patient Seen in: [facility] Emergency Department      History     Chief Complaint   Patient presents with    Back Pain     Stated Complaint: Back Pain    Subjective:   HPI          Objective:     Past Medical History:    Anxiety state, unspecified    saw therapist     Bartholin cyst    excision of Batholin's cyst    Herpes    last outbreak - , valtrex    History of abnormal Pap smear    LEEP    History of pregnancy    EAB,     Vulvar hematoma    post op, evac and revision surgery              Past Surgical History:   Procedure Laterality Date    Hysteroscopy      Leep      Other surgical history      Excision of Bartholin's cyst    Other surgical history  2010    Evacuation and revision surgery for vulvar hematoma                Social History     Socioeconomic History    Marital status: Single   Tobacco Use    Smoking status: Every Day     Current packs/day: 0.50     Average packs/day: 0.5 packs/day for 15.0 years (7.5 ttl pk-yrs)     Types: Cigarettes     Passive exposure: Current    Smokeless tobacco: Current    Tobacco comments:     not motivated to quit at this time   Vaping Use    Vaping status: Never Used   Substance and Sexual Activity    Alcohol use: Yes     Alcohol/week: 1.0 - 2.0 standard drink of alcohol     Types: 1 - 2 Glasses of wine per week     Comment: weekly    Drug use: No    Sexual activity: Yes     Partners: Male     Comment: none   Other Topics Concern    Caffeine Concern Yes     Comment: coffee 3 cups daily                  Physical Exam     ED Triage Vitals [25 0809]   /89   Pulse 93   Resp 18   Temp 97.3 °F (36.3 °C)   Temp src Oral   SpO2 99 %   O2 Device None (Room air)       Current Vitals:   Vital Signs  BP: 135/75  Pulse: 66  Resp: 18  Temp: 97.3 °F (36.3 °C)  Temp src: Oral    Oxygen Therapy  SpO2: 98 %  O2 Device: None (Room air)        Physical Exam        ED Course     Labs Reviewed   BASIC METABOLIC PANEL (8) - Abnormal; Notable for the  following components:       Result Value    Creatinine 1.49 (*)     eGFR-Cr 43 (*)     All other components within normal limits   CBC WITH DIFFERENTIAL WITH PLATELET - Abnormal; Notable for the following components:    Monocyte Absolute 1.10 (*)     All other components within normal limits   URINALYSIS WITH CULTURE REFLEX - Abnormal; Notable for the following components:    Clarity Urine Turbid (*)     Blood Urine 3+ (*)     Protein Urine 70 (*)     RBC Urine >10 (*)     Squamous Epi. Cells Few (*)     Hyaline Casts Present (*)     All other components within normal limits   HEPATIC FUNCTION PANEL (7) - Normal   POCT PREGNANCY URINE - Normal                   MDM      49-year-old female with history of renal colic in the past several months presents today with sudden onset of right flank pain at around 4:00 this morning.  She describes the pain as severe and nonradiating.  Denies fevers, vomiting, dysuria, or hematuria.  Has not taken medications for the pain.  Has an appointment with urology for tomorrow.    On exam, uncomfortable but nontoxic-appearing, vitals normal, right greater than left CVA tenderness, abdomen soft and nontender,    Differential: Renal colic, muscle spasm, pyelonephritis,    I performed and interpreted bedside renal ultrasound which showed mild to moderate right-sided hydronephrosis without visible stone.  Decompressed bladder after voiding.    Labs reassuring.  Urinalysis with hematuria without signs of infection.    Patient given Toradol, Zofran, and Tylenol and on reexamination had significantly improved symptoms.    Will plan to discharge the patient with analgesics, antiemetics, and instructions on urology follow-up with careful return precautions.    MDM    Disposition and Plan     Clinical Impression:  1. Renal colic         Disposition:  Discharge  2/12/2025 10:11 am    Follow-up:  your urologist    Go in 1 day(s)            Medications Prescribed:  Discharge Medication List as of  2/12/2025 10:18 AM        START taking these medications    Details   Ketorolac Tromethamine 10 MG Oral Tab Take 1 tablet (10 mg total) by mouth every 6 (six) hours as needed for Pain., Normal, Disp-28 tablet, R-0      !! ondansetron 4 MG Oral Tablet Dispersible Take 1 tablet (4 mg total) by mouth every 4 (four) hours as needed for Nausea., Normal, Disp-10 tablet, R-0       !! - Potential duplicate medications found. Please discuss with provider.              Supplementary Documentation:

## 2025-02-12 NOTE — ED INITIAL ASSESSMENT (HPI)
Patient ambulatory to ED c.o extreme low back pain that woke her up at 0400 throbbing was here a month ago for this issue believing she may have passed a stone and since it was her second occurrence at that time, was supposed to have a f/u with urology today but was rescheduled d/t snow coming. AXOX4. No blood in urine, is able to empty bladder, no fevers. + nausea no vomiting. Took a naproxen PTA.

## 2025-03-17 ENCOUNTER — HOSPITAL ENCOUNTER (OUTPATIENT)
Dept: ULTRASOUND IMAGING | Facility: HOSPITAL | Age: 50
Discharge: HOME | End: 2025-03-17
Attending: UROLOGY
Payer: COMMERCIAL

## 2025-03-17 DIAGNOSIS — N12 PYELONEPHRITIS: ICD-10-CM

## 2025-03-17 PROCEDURE — 76770 US EXAM ABDO BACK WALL COMP: CPT | Performed by: UROLOGY

## 2025-03-29 ENCOUNTER — TELEPHONE (OUTPATIENT)
Facility: LOCATION | Age: 50
End: 2025-03-29

## 2025-03-29 NOTE — TELEPHONE ENCOUNTER
Please relay to patient or Listed contact if unable to reach:  Dennis Das is unfortunately no longer with our organization,  My name is Dr. Reyez and I'm located in [facility] and assigned to his former patients thru [place].   I am reaching out in regards to remind you that  you are due forHypertension follow up, Establish care visit with New Physician, Colorectal cancer screening: given your age of greater than 45 I highly recommend you get your colorectal cancer screening if you decline screening colonoscopy a non invasive option is a stool Cologuard test which is done every three years. For average risk patients. Please follow up in clinic and we can discuss which option is best for you. I strive for delivering High quality/value care to my patients. If you have any questions, please schedule follow up with me  If you have already had a colonoscopy or Cologuard recently completed, please send Fax the records to our office [phone] However if you have established care with another provider please call our office [phone] or send us a [employer] message and we will remove your name from our list to indicate you have a new provider and will gladly send records to them if requested. for more information,  A Video on how to collect the cologuard sample on youtube: titled: How to properly use Cologuard colon cancer at-home test kit https://www.Cortexa.com/watch?v=IyudkXuThhI, and Annual Mammogram, Please schedule follow up/establish visit and will order if not already done     Please complete this within the next month as I value providing high value evidence based medical care and prevention and would like to go over the next steps needed In your wellness endeavor.     However if you have established care with another provider please call our office [phone] or send us a [employer] message and we will remove your name from our list to indicate you have a new  provider and will gladly send records to them if requested.      Wish you all the best in your endeavor for better health  -Dr. Johnathan Reyez MD, MPH

## 2025-03-31 ENCOUNTER — TELEPHONE (OUTPATIENT)
Facility: LOCATION | Age: 50
End: 2025-03-31

## (undated) NOTE — LETTER
2/8/2024          To Whom It May Concern:    Rocio Escobar is currently under my medical care and may not return to {em school/work:859825692} at this time.    Please excuse Rocio for {NUMBERS 0-10:3282} {days weeks:3323::\"days\"}.  {HE/SHE :6250} may return to {em school/work:809123269} on ***.  Activity is restricted as follows: {EM SCHOOL/WORK RESTRICTIONS:629477017}.    If you require additional information please contact our office.        Sincerely,    Garry Das MD          Document generated by:  Garry Das MD

## (undated) NOTE — MR AVS SNAPSHOT
[address]  [address]  [phone]               Thank you for choosing us for your health care visit with Marisa Farrell DPM.  We are glad to serve you and happy to provide yo You can access your MyChart to more actively manage your health care and view more details from this visit by going to [email or web address].   If you've recently had a stay at the Hospital you can access your discharge instructions in [address] by bennie

## (undated) NOTE — MR AVS SNAPSHOT
[place]  [address]  [phone]               Thank you for choosing us for your health care visit with Dalial Valdovinos MD.  We are glad to serve you and happy to provide you with this summary To schedule Physical Therapy at any of the [facility] facilities, please call [phone]. [place] for HUBERT VARELA [facility]  [address]  [address] [address] [address], [address]    [address] [facility] [email or web address]. If you've recently had a stay at the Hospital you can access your discharge instructions in [employer] by going to Visits < Admission Summaries.  If you've been to the Emergency Department or your doctor's office, you can view yo

## (undated) NOTE — Clinical Note
June 8, 2017         Anuj Angeles MD  [facility]      Patient: Lj Mederos   YOB: 1975   Date of Visit: 6/8/2017       Dear Dr. Edie Neal MD,    I saw your patient, Lj Mederos, on 6/8/2

## (undated) NOTE — LETTER
3/19/2020              [address]        [facility] [address]         Dear Deaharris Sommers,      It was a pleasure to see you at our [place] , [address] office.  You have negative pap and HPV test. Cont

## (undated) NOTE — ED AVS SNAPSHOT
[facility] Emergency Department    [address] [place] [address]    Phone:  [phone]    Fax:  Oracio Jagelvis Flores   MRN: [id number]    Department:  [facility] Emergency Department   Date of Visit coverage and benefits available for follow-up care and referrals. If you have difficulty scheduling your follow-up appointment as directed, please call our  at [phone].      Si tiene problemas para programar nathan jaylin de seguimiento s different from what your Primary Care doctor has instructed you - please continue to take your medications as instructed by your Primary Care doctor until you can check with your doctor. Please bring the medication list to your next doctor's appointment. can help with your Affordable Care Act coverage, as well as all types of Medicaid plans. To get signed up and covered, please call [phone] and ask to get set up for an insurance coverage that is in-network with [facility] In[facility]

## (undated) NOTE — LETTER
MINOR CASE LETTER      3/3/2020        Bharat Wei,    You are having and IUD removal, operative hysteroscopy with dilation and curettage, endometrial ablation with Novasure and laparoscopic bilateral tubal ligation on Monday, 3-23-20 at 1:00pm at [facility].    Do not eat or drink anything (including water) after midnight the night before surgery. If your procedure is scheduled later in the day, then nothing by mouth for 6 hours before arrival to the hospital.    Bryan Espino are to call this office if you have any cold or flu symptoms 2 days before your scheduled surgery. Please avoid ALL aspirin and herbal supplements for 7 days before surgery. Avoid Ibuprofen, Motrin, Aleve, Naprosyn or any NSAID medications for 3 days before surgery. You will be contacted by PreAdmission Testing (PAT) usually within the week before surgery. They will take a short medical history and let you know if any preoperative testing is needed. You cannot wear any hair pins, wigs, artificial nails or metallic nails for surgery. Please review and follow the attached Preoperative Antimicrobial Wash/Bath Instructions. A referral has been initiated and sent to the Managed Care Department to get approval for your surgery. Once it is approved you will get a notification through My Chart. Please make arrangements for someone to drive you home and to be with you for the rest of the day after your surgery because you will be having general anesthesia. Call our office now to schedule your post-operative appointment for 2 weeks after surgery. Please feel free to contact our office at  if you have any questions regarding these instructions or your procedure. Sincerely,    Daniel Hills. Bynum, [address] [place] [address], [address]  [address] [place] [address]  [phone]    Document electronically generated by:   Romeo Osborn RN

## (undated) NOTE — MR AVS SNAPSHOT
Justina  [address]  [phone]               Thank you for choosing us for your health care visit with Katie Swanson DO.   We are glad to serve you and happy to provide you with this summ medications prescribed for you. Read the directions carefully, and ask your doctor or other care provider to review them with you. Today's Orders     Mammo Screening BILATERAL    Complete by:   Feb 09, 2017 (Approximate)    Assoc Dx:  Screening m MCH 30.2 27.0-32.0 pg    MCHC 33.3 32.0-37.0 g/dl    RDW 13.4 11.0-15.0 %     140-400 K/UL    MPV 9.6 7.4-10.3 fL                THINPREP PAP SMEAR B           HPV DNA  HIGH RISK , THIN PREP COLLECTION      Component Value Standard Range & Units sectd\suqxzb66086\dhgclk77690\guttersxn0\dlhpvnyw2763\dbqxuulv1248\mujmgotw1484\qmhhngld4661\lnmzdvo760\oxnproc983\sbkpage\pgncont\pgndec\plain\plain\f0\fs24\ql\plain\f1\fs20\ghmi4325\hich\f1\dbch\f1\loch\f1\fs20 Negative for intraepithelial lesion or Jose Loots 1\par\par}    Clinical Information {\rtf1\rugazi24477\ansi\xvezpon6365\ftnbj\uc1\deff0{\fonttbl{\f0 \fswiss MS Sans Serif;}{\f1 \fswiss \fcharset0 [place];}{\f2 \fswiss \fcharset0 MS Sans Serif;}}{\colortbl ;\itt031\fhzcn530\furu123 ;\red0\green0\blue0 ;}{\ view more details from this visit by going to [email or web address]. If you've recently had a stay at the Hospital you can access your discharge instructions in [employer] by going to Visits < Admission Summaries.  If you've been to the Emergency Depar

## (undated) NOTE — Clinical Note
Nurys Murray, [address] [facility]       05/15/2017        Patient: Joslyn Late   YOB: 1975   Date of Visit: 5/15/2017       Dear  Dr. Lynn Means MD,      Thank you for referring El Flores to

## (undated) NOTE — ED AVS SNAPSHOT
Brian Norman   MRN: [id number]    Department:  [facility] Emergency Department   Date of Visit:  6/25/2019           Disclosure     Insurance plans vary and the physician(s) referred by the ER may not be covered by your plan.  Please c CARE PHYSICIAN AT ONCE OR RETURN IMMEDIATELY TO THE EMERGENCY DEPARTMENT. If you have been prescribed any medication(s), please fill your prescription right away and begin taking the medication(s) as directed.   If you believe that any of the medications

## (undated) NOTE — ED AVS SNAPSHOT
[facility] Emergency Department    [place] [address]     [place] [address]    Phone:  [phone]    Fax:  Mabel Flores   MRN: [id number]    Department:  [facility] Emergency Department   Date of Visit and Class Registration line at [phone] or find a doctor online by visiting [email or web address].    IF THERE IS ANY CHANGE OR WORSENING OF YOUR CONDITION, CALL YOUR PRIMARY CARE PHYSICIAN AT ONCE OR RETURN IMMEDIATELY TO [address] [place] [address].     If

## (undated) NOTE — LETTER
Date & Time: 3/9/2020, 12:33 PM  Patient: Jordy Flores  Encounter Provider(s):    Pratik Delatorre MD       To Whom It May Concern:    Trena Chahal was seen and treated in our department on 3/9/2020.  She may return to work 3/11/202

## (undated) NOTE — LETTER
AUTHORIZATION FOR SURGICAL OPERATION OR OTHER PROCEDURE    1.  I hereby authorize Dr. Ger Resendez, [facility] assigned to my case to perform the following operation and/or procedure at the [facility]:    ___________________________Wily Time:  ________ A. M.  P.M.        Patient Name:  ______________________________________________________  (please print)      Patient signature:  ___________________________________________________             Relationship to Patient:

## (undated) NOTE — LETTER
Date & Time: 12/4/2019, 9:07 AM  Patient: Moe Ruelas  Encounter Provider(s):    Sandra Christianson MD       To Whom It May Concern:    Melissa Douglas was seen and treated in our department on 12/4/2019. She should not return to work until 12/5/2019.

## (undated) NOTE — LETTER
MINOR CASE LETTER      11/13/2020        Dear Oskar Weldon,    Your are having a IUD removal,Operative Hysteroscopy with Dilation and Curettage, Novasure Ablation followed by Laparoscopic tubal with Rings on Wednesday,12/2/20 at 12:00pm at [facility] Please feel free to contact our office at [phone] if you have any questions regarding these instructions or your procedure. Sincerely,          Myriam Pryor.  [address] [place] [address], [address]